# Patient Record
Sex: FEMALE | Race: WHITE | NOT HISPANIC OR LATINO | Employment: OTHER | URBAN - METROPOLITAN AREA
[De-identification: names, ages, dates, MRNs, and addresses within clinical notes are randomized per-mention and may not be internally consistent; named-entity substitution may affect disease eponyms.]

---

## 2021-07-13 ENCOUNTER — LAB REQUISITION (OUTPATIENT)
Dept: LAB | Facility: CLINIC | Age: 81
End: 2021-07-13
Payer: COMMERCIAL

## 2021-07-13 DIAGNOSIS — I48.91 UNSPECIFIED ATRIAL FIBRILLATION (H): ICD-10-CM

## 2021-07-13 LAB — INR PPP: 3.19 (ref 0.85–1.15)

## 2021-07-13 PROCEDURE — 85610 PROTHROMBIN TIME: CPT | Mod: ORL | Performed by: INTERNAL MEDICINE

## 2024-08-12 ENCOUNTER — TRANSFERRED RECORDS (OUTPATIENT)
Dept: HEALTH INFORMATION MANAGEMENT | Facility: CLINIC | Age: 84
End: 2024-08-12
Payer: COMMERCIAL

## 2024-08-15 ENCOUNTER — TRANSFERRED RECORDS (OUTPATIENT)
Dept: HEALTH INFORMATION MANAGEMENT | Facility: CLINIC | Age: 84
End: 2024-08-15
Payer: COMMERCIAL

## 2024-08-15 ENCOUNTER — MEDICAL CORRESPONDENCE (OUTPATIENT)
Dept: HEALTH INFORMATION MANAGEMENT | Facility: CLINIC | Age: 84
End: 2024-08-15
Payer: COMMERCIAL

## 2024-08-15 ENCOUNTER — TRANSCRIBE ORDERS (OUTPATIENT)
Dept: OTHER | Age: 84
End: 2024-08-15

## 2024-08-15 DIAGNOSIS — C55 ENDOMETRIOID ADENOCARCINOMA OF UTERUS (H): Primary | ICD-10-CM

## 2024-08-16 ENCOUNTER — PATIENT OUTREACH (OUTPATIENT)
Dept: ONCOLOGY | Facility: CLINIC | Age: 84
End: 2024-08-16
Payer: COMMERCIAL

## 2024-08-16 NOTE — PROGRESS NOTES
"New Patient Hematology / Oncology Nurse Navigator Note     Referral Date: 8/16/24    Referring provider:       Referring Clinic/Organization:        Referred to: GynOnc    Requested provider (if applicable): First available - did not specify     Evaluation for :        Clinical History (per Nurse review of records provided):    6/7/24 Pelvic US ([facility])  Impression  1. Thickened heterogeneous endometrium measuring 3.7 cm. Given the patient`s age, primary differential consideration would be underlying mass.  2. Nonvisualization of the ovaries. -- BOOKMARKED  6/8/24 Note from OBGYN at [facility]:  \"Pt verbalized frustration with her care that she received at [facility]. She stated that she was not interested in any follow-up procedures or appointments, as she feels it would not change her course of action.   She states she had [facility] \"several years ago\" and \"all of the painful testing they did came up with nothing.\" She states that \"even if they found cancer, I'm not going to have surgery or do any treatment anyway.\"   She respectfully stated that she would not like to follow-up and declines any further interventions.\"  -- BOOKMARKED  8/15/24 Path:   -- BOOKMARKED    Clinical Assessment / Barriers to Care (Per Nurse):  Pt uses [employer] for transportation so needs later morning/afternoon appointment. Lives in [place]    Records Location: Care Everywhere   Faxed - Media tab/Scanned     Records Needed:   Images from [facility] ([place])    Additional testing needed prior to consult:   N/A    Referral updates and Plan:   OUTGOING CALL to pt:  Introduced my role as nurse navigator with [facility] Hematology/Oncology dept and that we have recd the referral for dx of Endometrial Cancer from Dr Adams  Pt confirms they are aware of the referral and ready to schedule  Provided contact information if future questions arise.     -Transferred pt to NPS line [phone] to schedule appt per scheduling instructions.    Pema Pate, " BSN, RN, PHN, OCN  Hematology/Oncology Nurse Navigator  [facility]  [phone]

## 2024-08-26 NOTE — TELEPHONE ENCOUNTER
RECORDS STATUS - ALL OTHER DIAGNOSIS      RECORDS RECEIVED FROM: Neptali /KRISTINA    Appt Date: 8/27/2024    Endometrioid adenocarcinoma of uterus (H)   Action    Action Taken 8/26/2024 12:06pm BENJAMIN     I called [facility] IMG Dept [phone]- they will push the ultrasound image to  PACS    I called [facility] - unavailable.     I called pt Hortensia - outside records are at [facility].     I called [facility] Ph: [phone] #3 - I was on hold... Their phone went to . I called again #4 Fax: [phone] It's lunch time for the nurses until 1pm. I sent over a STAT fax request for records and reports.     I resolved Ne[facility]ali's Scan in PACS.     8/27/2024 9:05AM BENJAMIN   I faxed records from [facility] to HIM and the NN team.     I called [facility] again to find out more info about the biopsy - unavailable      NOTES STATUS DETAILS   OFFICE NOTE from referring provider    Nichole Adams MD      OFFICE NOTE from medical oncologist Received- OGG[facility]    DISCHARGE SUMMARY from hospital CE 6/7/2024- CE   Post-menopausal bleeding    CLINICAL TRIAL TREATMENTS TO DATE     LABS     PATHOLOGY REPORTS Received-  [facility]     ANYTHING RELATED TO DIAGNOSIS In CE Labs last updated on 7/29/2024    PATHOLOGY FEDEX TRACKING   TRACKING #:   GENONOMIC TESTING     TYPE:     IMAGING (NEED IMAGES & REPORT)     CT SCANS     MRI     XRAYS     ULTRASOUND In PACS- [facility]  US Pelvis Complete 6/7/2024    PET     IMAGE DISC FEDEX TRACKING   TRACKING #:

## 2024-08-27 ENCOUNTER — PRE VISIT (OUTPATIENT)
Dept: ONCOLOGY | Facility: CLINIC | Age: 84
End: 2024-08-27
Payer: COMMERCIAL

## 2024-08-27 ENCOUNTER — ONCOLOGY VISIT (OUTPATIENT)
Dept: ONCOLOGY | Facility: CLINIC | Age: 84
End: 2024-08-27
Attending: STUDENT IN AN ORGANIZED HEALTH CARE EDUCATION/TRAINING PROGRAM
Payer: COMMERCIAL

## 2024-08-27 ENCOUNTER — PATIENT OUTREACH (OUTPATIENT)
Dept: ONCOLOGY | Facility: CLINIC | Age: 84
End: 2024-08-27
Payer: COMMERCIAL

## 2024-08-27 VITALS — DIASTOLIC BLOOD PRESSURE: 70 MMHG | SYSTOLIC BLOOD PRESSURE: 128 MMHG | HEART RATE: 83 BPM

## 2024-08-27 DIAGNOSIS — C55 ENDOMETRIOID ADENOCARCINOMA OF UTERUS (H): Primary | ICD-10-CM

## 2024-08-27 DIAGNOSIS — C54.1 ENDOMETRIAL CANCER (H): Primary | ICD-10-CM

## 2024-08-27 PROCEDURE — G0463 HOSPITAL OUTPT CLINIC VISIT: HCPCS | Performed by: OBSTETRICS & GYNECOLOGY

## 2024-08-27 PROCEDURE — 99205 OFFICE O/P NEW HI 60 MIN: CPT | Performed by: OBSTETRICS & GYNECOLOGY

## 2024-08-27 RX ORDER — LORAZEPAM 0.5 MG/1
0.5 TABLET ORAL EVERY 6 HOURS PRN
Status: ON HOLD | COMMUNITY
End: 2024-09-18

## 2024-08-27 RX ORDER — ATENOLOL 50 MG/1
50 TABLET ORAL 2 TIMES DAILY
COMMUNITY
Start: 2023-04-28

## 2024-08-27 RX ORDER — WARFARIN SODIUM 2 MG/1
4 TABLET ORAL DAILY
Status: ON HOLD | COMMUNITY
End: 2024-09-18

## 2024-08-27 RX ORDER — HYDRALAZINE HYDROCHLORIDE 50 MG/1
50 TABLET, FILM COATED ORAL 2 TIMES DAILY
Status: ON HOLD | COMMUNITY
Start: 2023-04-28 | End: 2024-09-24

## 2024-08-27 RX ORDER — PREGABALIN 100 MG/1
100 CAPSULE ORAL 2 TIMES DAILY
COMMUNITY
Start: 2022-12-21

## 2024-08-27 RX ORDER — LEVOTHYROXINE SODIUM 75 UG/1
75 TABLET ORAL DAILY
COMMUNITY
Start: 2023-04-28

## 2024-08-27 RX ORDER — CLONIDINE HYDROCHLORIDE 0.1 MG/1
0.1 TABLET ORAL
Status: ON HOLD | COMMUNITY
Start: 2022-10-04 | End: 2024-09-18

## 2024-08-27 RX ORDER — OXYCODONE AND ACETAMINOPHEN 5; 325 MG/1; MG/1
1 TABLET ORAL EVERY 8 HOURS PRN
Status: ON HOLD | COMMUNITY
Start: 2024-08-19 | End: 2024-09-23

## 2024-08-27 RX ORDER — BACLOFEN 20 MG
1 TABLET ORAL DAILY PRN
COMMUNITY

## 2024-08-27 ASSESSMENT — PAIN SCALES - GENERAL: PAINLEVEL: EXTREME PAIN (8)

## 2024-08-27 NOTE — PROGRESS NOTES
GYNECOLOGIC ONCOLOGY  [address] [address]  Phone: [phone]  Fax: [phone]     Patient:  Hortensia Xie  YOB: 1940  Date of Visit: 08/28/2024    Referring Provider  Jameel Miller MD  [address] [place] [id number]    Reason for visit: endometrial cancer    Assessment    Hortensia Xie is a 83 year old post-menopausal patient PMH notable for partial colectomy, stroke, sinus node dysfunction, cardiac pacemaker in place, atrial fibrillation on anti-coagulation, Type 2 diabetes declines medications, chronic pain on opioid therapy, and hypertension with newly diagnosed endometrial cancer.     Plan   - Endometrial cancer with vaginal bleeding: discussed that standard treatment for endometrial cancer is with hysterectomy. Additional therapy may sometimes be recommended including radiation therapy or chemotherapy pending results from surgery. In her case this has to be balanced against the risks of complications related to surgery with her medical co-morbidities. However, given her need for chronic anticoagulation and symptomatic vaginal bleeding, I think consideration of surgery is necessary although there is risk given her medical conditions.   - She is able to engage in discussion of the risks and verbalizes a desire to proceed with surgery once optimized. Discussed biggest concerns are related to her anticoagulation and cardiac conditions. She has an appointment with her cardiologist in September, so I have advised her to discuss medication management jese-operatively as well as any further work-up they would recommend prior to surgery.   - Even if cleared for surgery we had a anibal discussion of the significant risks of morbidity and even mortality. She notes a willingness and desire to accept these risks for the possibility of cancer treatment and improvement in quality of life.   - Pending surgical  clearance, discussed minimizing surgical risk by performing only TLH-BSO without lymph node assessment. May consider injectin ICG and doing biopsy pending tolerance of surgery, but we will determine that closer to time of surgery.     Anticoagulation plan: needs to discuss with her cardiologist whether she would need bridging from warfarin.     Pacemaker in place: per outside records: (she reports repalcement in 2019 but per records battery exchange 2019)  Indication for Pacemaker: Sinus Node Dysfunction - Tachy/French   Primary EP / Cardiologist: Jamil Peters MD   DEVICE DATA  : Medtronic Model: [place] [id number] Implant Date: 2019  LEAD DATA   Atrial Lead:  Medtronic:  Model 4076-45 Implant Date 2008  RV Lead:  Medtronic:  Model 5076-52 Implant Date 2008     - Chronic pain secondary to stroke: on oxycodone 3-4 tablets a day. Discussed jese-op pain management.     -History of CVA: thought secondary to embolism per cardiology notes. Has residual right sided weakness and paresthesias.    Surgical history: history of partial colectomy for stricture likely secondary to diverticulitis. No records available. Has vertical midline. Also history of  section. Discussed increased risk of bowel injury, complications with surgery.     Next steps: needs to see cardiology, keep appointment 2024. Pending that appointment may need clearance by PAC or Internal medicine unless all medications are managed by cardiology. Once plan in place can schedule for surgery. Will need admission post-op due to co-morbidities. Needs social work to help with ride. Lives alone, may need placement but strongly desires discharge to home. Has hired helper intermittently, support from [employer], but no one in the home or family/friends nearby who can stay with her.     Jameel Miller MD   Gynecologic Oncology     ~~~~~~~~~~~~~~~~~~~~~~~~~~~~~~~~~~~~~~~~~~~~~~~~~~~~~~~~~~~~~~~~~~~~~~~    History of  Present Illness   Per chart review: PMB. Thickened lining to 3.7cm (reviewed images and diffusely thickened). Multiple comorbidities including cardiac as noted elsewhere.     Per patient report: Around age 70 had episodes of bleeding and worked up and was negative. Current bleeding has been going on for a 4 months. Comes and goes. Heavy bleeding when it occurs. Is on warfarin. Goal 2-2.5. Has some pain. Lives independently. Does own ADLs. Uses wheeled walker. Uses shower chair. On lyrica for post-stroke symptoms of neuropathy.     OB/Gynecologic History:  Cesearean section  PMB in 60s with negative work-up    Past Medical History:   Diagnosis Date    Anticoagulated on warfarin     Cardiac pacemaker in situ     Chronic atrial fibrillation (H)     Ablation in     Chronic, continuous use of opioids     Chronic pain from stroke    Endometrial cancer (H)     HTN (hypertension)     Stroke, embolic (H)     Residual right sided weakness    Type 2 diabetes mellitus (H)     Declines medication     Past Surgical History:   Procedure Laterality Date     SECTION      COLECTOMY PARTIAL      Primary anastomosis, due to diverticulitis?    IMPLANT PACEMAKER       No family history on file.  Social History     Tobacco Use    Smoking status: Never    Smokeless tobacco: Never       Physical Exam   /70 (BP Location: Left arm)   Pulse 83   Gen: no acute distress, uses wheeled walker for ambulation outside home.   Abd: vertical midline incision noted, non-tender, slightly protuberant which is normal for her    Data   Laboratory data and imaging listed below was reviewed prior to this encounter  Labs/Pathology:   Reviewed recent HgbA1c, CBC, INR, BMP from -2024    Imaging:   Impression    1. Thickened heterogeneous endometrium measuring 3.7 cm. Given the patient`s age, primary differential consideration would be underlying mass.  2. Nonvisualization of the ovaries.    Dictated by Giovani Tai MD @ 2024 10:21:15  PM    I spent a total of 60 minutes on the care of Hortensia Xie on the day of service including face to face time, care coordination, and documentation on the day of service.

## 2024-08-27 NOTE — NURSING NOTE
Oncology Rooming Note    August 27, 2024 11:21 AM   Hortensia Xie is a 83 year old female who presents for:    Chief Complaint   Patient presents with    Oncology Clinic Visit     Initial Vitals: /70 (BP Location: Left arm)   Pulse 83  There is no height or weight on file to calculate BMI. There is no height or weight on file to calculate BSA.  Extreme Pain (8) Comment: Data Unavailable   No LMP recorded.  Allergies reviewed: Yes  Medications reviewed: Yes    Medications: Medication refills not needed today.  Pharmacy name entered into [employer]: [id number] - [place], [place] - [address] [facility] [address]    Frailty Screening:   Is the patient here for a new oncology consult visit in cancer care? 1. Yes. Over the past month, have you experienced difficulty or required a caregiver to assist with:   1. Balance, walking or general mobility (including any falls)? YES  2. Completion of self-care tasks such as bathing, dressing, toileting, grooming/hygiene?  NO  3. Concentration or memory that affects your daily life?  NO       Clinical concerns: Patient will discuss concerns with provider.       Noel Rodriguez MA

## 2024-08-27 NOTE — PROGRESS NOTES
[facility]: Cancer Care Initial Note                                    Discussion with Patient:                                                      RN met with patient in clinic this afternoon, following her consult appointment with Dr. Miller.  Introduced self and role as RN Care Coordinator with Dr. Miller at the [facility].  Provided contact information for our clinic, including phone numbers for our scheduling team, triage nurses, after-hours advice line, and direct phone number for this RN.    Provided patient with a surgery packet/handouts, and a bottle of Hibiclens soap.  Due to time constraints (patient had to catch a [employer] bus by 12:30 pm), patient was unable to sign surgery consents while in clinic today.  She will plan to sign consents the day of surgery.      Patient also did not have time to stay for surgery education today.  Writer will plan to review the education with patient via telephone sometime prior to her surgery.       Plan:                                                       Patient was informed that she will be receiving a telephone call from our scheduling team soon, to schedule her surgery and post-op appointment with Dr. Miller.      Also informed patient that writer will plan to reach out to her via telephone sometime prior to her surgery, to review pre-op education.      Social Work referral was entered at Dr. Miller's request, due to potential transportation issues and post-op care issues (elderly patient with no family nearby - all friends are elderly - who relies on [employer] for transportation).  Per Dr. Miller, patient will likely end up staying overnight following her surgery, due to her advanced age and co-morbidities.      Patient is scheduled to follow up with her Cardiologist through [facility] on 9/13/24 (Dr. Jamil Peters).  Dr. Miller asked to have Dr. Peters's office called, to inform them of patient's surgery plan -  since patient will need to have a plan made for her anticoagulation.  Writer will plan to reach out to Dr. Peters's office to provide this update.      Per Dr. Miller, if patient's Cardiologist isn't able/willing to clear patient for surgery, and to provide a plan for her anticoagulation - then patient would need to schedule a pre-op H&P exam with either her PCP, or through the PAC Clinic.    Encouraged patient to reach out with any questions or concerns following today's visit.    Luisito Lerner, RN, BSN, OCN  RN Care Coordinator - Oncology  [facility]

## 2024-08-27 NOTE — LETTER
8/27/2024      Hortensia Xie  [address] [place] [address]      Dear Colleague,    Thank you for referring your patient, Hortensia Xie, to the [facility]. Please see a copy of my visit note below.      GYNECOLOGIC ONCOLOGY  [facility]  [address] [address] [place] [address]  Phone: [phone]  Fax: [phone]     Patient:  Hortensia Xie  YOB: 1940  Date of Visit: 08/28/2024    Referring Provider  Jameel Miller MD  [address] [place] [address]    Reason for visit: endometrial cancer    Assessment   Hortensia Xie is a 83 year old post-menopausal patient PMH notable for partial colectomy, stroke, sinus node dysfunction, cardiac pacemaker in place, atrial fibrillation on anti-coagulation, Type 2 diabetes declines medications, chronic pain on opioid therapy, and hypertension with newly diagnosed endometrial cancer.     Plan  - Endometrial cancer with vaginal bleeding: discussed that standard treatment for endometrial cancer is with hysterectomy. Additional therapy may sometimes be recommended including radiation therapy or chemotherapy pending results from surgery. In her case this has to be balanced against the risks of complications related to surgery with her medical co-morbidities. However, given her need for chronic anticoagulation and symptomatic vaginal bleeding, I think consideration of surgery is necessary although there is risk given her medical conditions.   - She is able to engage in discussion of the risks and verbalizes a desire to proceed with surgery once optimized. Discussed biggest concerns are related to her anticoagulation and cardiac conditions. She has an appointment with her cardiologist in September, so I have advised her to discuss medication management jese-operatively as well as any further work-up they would recommend prior to surgery.   - Even if cleared for  surgery we had a anibal discussion of the significant risks of morbidity and even mortality. She notes a willingness and desire to accept these risks for the possibility of cancer treatment and improvement in quality of life.   - Pending surgical clearance, discussed minimizing surgical risk by performing only TLH-BSO without lymph node assessment. May consider injectin ICG and doing biopsy pending tolerance of surgery, but we will determine that closer to time of surgery.     Anticoagulation plan: needs to discuss with her cardiologist whether she would need bridging from warfarin.     Pacemaker in place: per outside records: (she reports repalcement in 2019 but per records battery exchange 2019)  Indication for Pacemaker: Sinus Node Dysfunction - Tachy/French   Primary EP / Cardiologist: Jamil Peters MD   DEVICE DATA  : Medtronic Model: [place] [id number] Implant Date: 2019  LEAD DATA   Atrial Lead:  Medtronic:  Model 4076-45 Implant Date 2008  RV Lead:  Medtronic:  Model 5076-52 Implant Date 2008     - Chronic pain secondary to stroke: on oxycodone 3-4 tablets a day. Discussed jese-op pain management.     -History of CVA: thought secondary to embolism per cardiology notes. Has residual right sided weakness and paresthesias.    Surgical history: history of partial colectomy for stricture likely secondary to diverticulitis. No records available. Has vertical midline. Also history of  section. Discussed increased risk of bowel injury, complications with surgery.     Next steps: needs to see cardiology, keep appointment 2024. Pending that appointment may need clearance by PAC or Internal medicine unless all medications are managed by cardiology. Once plan in place can schedule for surgery. Will need admission post-op due to co-morbidities. Needs social work to help with ride. Lives alone, may need placement but strongly desires discharge to home. Has hired  helper intermittently, support from [employer], but no one in the home or family/friends nearby who can stay with her.     Jameel Miller MD   Gynecologic Oncology     ~~~~~~~~~~~~~~~~~~~~~~~~~~~~~~~~~~~~~~~~~~~~~~~~~~~~~~~~~~~~~~~~~~~~~~~    History of Present Illness  Per chart review: PMB. Thickened lining to 3.7cm (reviewed images and diffusely thickened). Multiple comorbidities including cardiac as noted elsewhere.     Per patient report: Around age 70 had episodes of bleeding and worked up and was negative. Current bleeding has been going on for a 4 months. Comes and goes. Heavy bleeding when it occurs. Is on warfarin. Goal 2-2.5. Has some pain. Lives independently. Does own ADLs. Uses wheeled walker. Uses shower chair. On lyrica for post-stroke symptoms of neuropathy.     OB/Gynecologic History:  Cesearean section  PMB in 60s with negative work-up    Past Medical History:   Diagnosis Date     Anticoagulated on warfarin      Cardiac pacemaker in situ      Chronic atrial fibrillation (H)     Ablation in      Chronic, continuous use of opioids     Chronic pain from stroke     Endometrial cancer (H)      HTN (hypertension)      Stroke, embolic (H)     Residual right sided weakness     Type 2 diabetes mellitus (H)     Declines medication     Past Surgical History:   Procedure Laterality Date      SECTION       COLECTOMY PARTIAL      Primary anastomosis, due to diverticulitis?     IMPLANT PACEMAKER       No family history on file.  Social History     Tobacco Use     Smoking status: Never     Smokeless tobacco: Never       Physical Exam  /70 (BP Location: Left arm)   Pulse 83   Gen: no acute distress, uses wheeled walker for ambulation outside home.   Abd: vertical midline incision noted, non-tender, slightly protuberant which is normal for her    Data  Laboratory data and imaging listed below was reviewed prior to this encounter  Labs/Pathology:   Reviewed recent HgbA1c, CBC, INR, BMP from  1-6/2024    Imaging:   Impression    1. Thickened heterogeneous endometrium measuring 3.7 cm. Given the patient`s age, primary differential consideration would be underlying mass.  2. Nonvisualization of the ovaries.    Dictated by Giovani Tai MD @ 6/7/2024 10:21:15 PM    I spent a total of 60 minutes on the care of Hortensia Xie on the day of service including face to face time, care coordination, and documentation on the day of service.         Again, thank you for allowing me to participate in the care of your patient.        Sincerely,        Jameel Miller MD

## 2024-08-28 ENCOUNTER — PATIENT OUTREACH (OUTPATIENT)
Dept: CARE COORDINATION | Facility: CLINIC | Age: 84
End: 2024-08-28
Payer: COMMERCIAL

## 2024-08-28 NOTE — PROGRESS NOTES
Social Work - [email or web address] message  [facility]  Data: Dx endometrial cancer follows with Dr. Miller.  Patient Name: Hortensia Xie  Goes By: Hortensia BAGLEY/Age: 1940 (83 year old)      Referral Source: Luisito Benedict RN    Reason for Referral: Resources post surgery.     Intervention:  Spoke with Hortensia and she asked if writer could call her on Friday .   Plan:  will contact on Friday.  YENI Mckinney, [facility]   Adult Oncology - [place]  [phone]  [employer] [place] on    *Please note does not work on .   Support Groups at [facility]: Social Work Services for Cancer Patients ([email or web address])

## 2024-08-29 ENCOUNTER — PATIENT OUTREACH (OUTPATIENT)
Dept: ONCOLOGY | Facility: CLINIC | Age: 84
End: 2024-08-29
Payer: COMMERCIAL

## 2024-08-29 NOTE — PROGRESS NOTES
[facility]: Cancer Care Note                                                          Writer placed telephone call to the [facility] this afternoon (phone number: [phone]), to inform patient's Cardiologist (Dr. Jamil Peters) of patient's surgery plans.  Patient is planning to undergo TLH/BSO with Dr. Miller in the near future (not yet scheduled), and will require clearance from Cardiology for the surgery - as well as a plan for her anticoagulation.      Ended up having to leave a detailed voicemail message, with above information.  Provided direct phone number for this RN in case of any questions.    Luisito Lerner, RN, BSN, OCN  RN Care Coordinator - Oncology  [facility]

## 2024-09-03 ENCOUNTER — TELEPHONE (OUTPATIENT)
Dept: ONCOLOGY | Facility: CLINIC | Age: 84
End: 2024-09-03
Payer: COMMERCIAL

## 2024-09-03 NOTE — TELEPHONE ENCOUNTER
Spoke with patient, They are going to follow with cardiologist and PCP to be cleared for anesthesia. Patient stated they would call writer back after confirming and then scheduled with Dr. Miller. Yumi Peacock on 9/3/2024 at 9:49 AM

## 2024-09-05 ENCOUNTER — HOSPITAL ENCOUNTER (INPATIENT)
Facility: CLINIC | Age: 84
Setting detail: SURGERY ADMIT
End: 2024-09-05
Attending: OBSTETRICS & GYNECOLOGY | Admitting: OBSTETRICS & GYNECOLOGY
Payer: COMMERCIAL

## 2024-09-05 NOTE — TELEPHONE ENCOUNTER
Spoke with patient    Patient is schedule for surgery with: Dr. Miller    Surgery Date: 9/19     Location: [facility]    H&P: to be completed by: completed by Cardiologist on 9/13 (per patient they already confirmed that cardiologist will do H&P)     Post-op: will be scheduled by the clinic    Patient was informed that a surgery center pre-op RN will call 2-3 days prior to surgery with arrival time and instructions: Yes     Patient aware times are subject to change up until day before surgery.     Patient questions/concerns: N/A     Surgery packet to be sent via US mail      Yumi Peacock on 9/5/2024 at 3:39 PM

## 2024-09-09 ENCOUNTER — PATIENT OUTREACH (OUTPATIENT)
Dept: CARE COORDINATION | Facility: CLINIC | Age: 84
End: 2024-09-09
Payer: COMMERCIAL

## 2024-09-09 NOTE — PROGRESS NOTES
Social Work - Intervention  [facility]  Data/Intervention:  Dx endometrial cancer follows with Dr. Miller   Patient Name: Hortensia Xie Goes By: Hortensia BAGLEY/Age: 1940 (84 year old)     Visit Type: telephone  Referral Source: Luisito Lerner RNCC  Reason for Referral: Support with plans for after surgery.     Psychosocial Information/Concerns:  Limited support system and need for support following surgery. Daughter - out of state.      Intervention/Education/Resources Provided:  [employer] they offer  care - private pay cost and they provide transportation with .     Unsure if she can afford. Will talk to people at [employer] to see if they can assist.      Assessment/Plan:  She did not need anything else today.     Provided patient/family with contact information and availability.  YENI Mckinney, [facility]   Adult Oncology - [place]  [phone]  [employer] [place] on    *Please note does not work on .   Support Groups at [facility]: Social Work Services for Cancer Patients ([email or web address])

## 2024-09-13 ENCOUNTER — PATIENT OUTREACH (OUTPATIENT)
Dept: ONCOLOGY | Facility: CLINIC | Age: 84
End: 2024-09-13
Payer: COMMERCIAL

## 2024-09-13 NOTE — PROGRESS NOTES
[facility]: Cancer Care Note    Relevant Diagnosis:  Endometrial cancer    Procedure:  Total laparoscopic hysterectomy, bilateral salpingo-oophorectomy.    Procedure Date: 9/19/24    Notes: Attempted reaching patient via telephone this afternoon, to review surgery education.  Patient did not answer, and no voicemail picked up - unable to leave message.  Noted patient scheduled to meet with her Cardiologist today for pre-op exam.    Writer will plan to try calling patient again early next week.      Luisito Lerner, RN, BSN, OCN  RN Care Coordinator - Oncology  [facility]

## 2024-09-17 ENCOUNTER — TELEPHONE (OUTPATIENT)
Dept: ONCOLOGY | Facility: CLINIC | Age: 84
End: 2024-09-17
Payer: COMMERCIAL

## 2024-09-17 ENCOUNTER — PATIENT OUTREACH (OUTPATIENT)
Dept: CARE COORDINATION | Facility: CLINIC | Age: 84
End: 2024-09-17
Payer: COMMERCIAL

## 2024-09-17 NOTE — TELEPHONE ENCOUNTER
----- Message from Luisito MUÑOZ sent at 9/17/2024  2:31 PM CDT -----  Regarding: Pain, bleeding  Hello,    Would someone be able to call this patient to assess her symptoms?  Our  spoke with her today about transportation options before/after surgery (she's scheduled to have surgery with Dr. Miller on 9/19).    Patient mentioned to the SW that she was having very heavy bleeding over the weekend (she mentioned going through multiple pads per hour - unsure if these were fully saturated or not, or how long she was bleeding this heavily).  Patient also mentioned that she is having a lot of pain.    Thank you,  Luisito

## 2024-09-17 NOTE — PROGRESS NOTES
"Social Work - Follow-Up  [facility]    Data/Intervention:  Patient having surgery on the [place] on 24.     Patient Name: Hortensia Xie Goes By: Hortensia BAGLEY/Age: 1940 (84 year old)    Reason for Follow-Up:  Check in/transportation     Collaborated With:    Terri, RNCC    Intervention/Education/Resources Provided:  Patient has transportation arranged both ways to/from surgery. Has a friend to spend the night after surgery with her if discharged after procedure.    Wonders about staying overnight. Worried about bleeding she has had. She understands she has been cleared from cardiology for surgery but scared and wonders if needs monitoring. She is grateful to have the surgery to \"get the cancer out of me\".    Assessment/Plan:  Updated RNCC of plans she has made and of her concerns of bleeding.    Previously provided patient/family with writer's contact information and availability.    YENI Mckinney, [facility]   Adult Oncology - [place]  [phone]  Onsite [place] on    *Please note does not work on .   Support Groups at [facility]: Social Work Services for Cancer Patients ([email or web address])     "

## 2024-09-17 NOTE — TELEPHONE ENCOUNTER
Per Aren, RNCC:    Ok, here is what Dr. Miller recommends for Hortensia Xie:      Schedule lab appointment before surgery - Dr. Miller said she was going to enter lab orders.  Continue holding Coumadin.  Continue taking all other prescription medications until surgery (aside from holding any supplements).  If bleeding symptoms become much worse, or if patient develops worsening symptoms (SOB, chest pain, severe dizziness) - she should go to the [facility] ER on [address], and they will likely just admit her in the hospital ahead of surgery.  The plan is for patient to be admitted overnight after surgery.      Pt was notified with recommendations, and pt verbalized understanding.  Lab appt has been scheduled for 9/18/2024 at 12:30 pm. Pt has called and arranged a ride through [employer].  Pt was also given phone number for after hours triage line in case she has any questions or concerns when clinic is closed.  Reviewed emergent symptoms that would warrant return call to clinic or evaluation in ER.  Patient verbalized understanding and agreement with plan.  Patient was instructed to call the clinic with any questions, concerns, or worsening symptoms.  Candy Haile RN on 9/17/2024 at 4:51 PM

## 2024-09-17 NOTE — TELEPHONE ENCOUNTER
"Oncology Nurse Triage    Situation:   Hortensia reporting the following symptoms: vaginal bleeding     Background:   Treating Provider:   Dr Miller     Date of last office visit: 8/27/2024    Recent Treatments:scheduled for TLH/BSO on 9/19/2024    Assessment:     Writer called pt to follow up after receiving message from RNCC regarding vaginal bleeding.  Pt states that she started having vaginal bleeding after she arrived home from her appt with cardiology on 9/13/2024. States that bleeding was heavy and persisted from 9/13 through yesterday 9/16/2024. Pt states that she was saturating her pad every 4 hours. She also had blood clots. She had severe lower abdominal cramping that felt like menstrual cramps that she rated at 8-9/10 on pain scale.     Pt reports that her bleeding has stopped today. She still has lower abdominal cramping that she rates at 7/10. Pt reports that she does feel weaker today, but denies chest pain or shortness of breath. Pt states that she \"just wants to get the cancer out\". Denies any other signs of bleeding.     Pt states that she never received instructions about stopping her coumadin before surgery. Her cardiologist told her it would be OK to hold coumadin for 3-5 days as her surgeon instructs for upcoming hysterectomy. Pt states that she decreased her dose of coumadin to 2 mg on 9/14/2024. She did not take any coumadin on 9/15 or 9/16.    Recommendations:     Will route to Dr Miller for review and recommendations regarding symptoms and coumadin. Also paged Dr Miller.   Patient verbalized understanding and agreement with plan.  Patient was instructed to call the clinic with any questions, concerns, or worsening symptoms.  Candy Haiel RN on 9/17/2024 at 3:22 PM          "

## 2024-09-18 ENCOUNTER — TELEPHONE (OUTPATIENT)
Dept: ONCOLOGY | Facility: CLINIC | Age: 84
End: 2024-09-18
Payer: COMMERCIAL

## 2024-09-18 ENCOUNTER — HOSPITAL ENCOUNTER (INPATIENT)
Facility: CLINIC | Age: 84
LOS: 6 days | Discharge: NURSING, REHAB OR LONG-TERM CARE | DRG: 740 | End: 2024-09-24
Attending: EMERGENCY MEDICINE | Admitting: OBSTETRICS & GYNECOLOGY
Payer: COMMERCIAL

## 2024-09-18 ENCOUNTER — ANESTHESIA EVENT (OUTPATIENT)
Dept: SURGERY | Facility: CLINIC | Age: 84
DRG: 740 | End: 2024-09-18
Payer: COMMERCIAL

## 2024-09-18 DIAGNOSIS — I10 HYPERTENSION, UNSPECIFIED TYPE: ICD-10-CM

## 2024-09-18 DIAGNOSIS — Z95.0 PACEMAKER: ICD-10-CM

## 2024-09-18 DIAGNOSIS — Z79.891 LONG-TERM CURRENT USE OF OPIATE ANALGESIC: ICD-10-CM

## 2024-09-18 DIAGNOSIS — C54.1 ENDOMETRIAL CANCER (H): Primary | ICD-10-CM

## 2024-09-18 DIAGNOSIS — Z90.49 ACQUIRED ABSENCE OF LARGE INTESTINE: ICD-10-CM

## 2024-09-18 DIAGNOSIS — Z86.79 PERSONAL HISTORY OF OTHER DISEASES OF THE CIRCULATORY SYSTEM: ICD-10-CM

## 2024-09-18 DIAGNOSIS — Z86.73 HISTORY OF STROKE: ICD-10-CM

## 2024-09-18 DIAGNOSIS — N93.9 VAGINAL BLEEDING: ICD-10-CM

## 2024-09-18 DIAGNOSIS — Z86.39 PERSONAL HISTORY OF ENDOCRINE DISORDER: ICD-10-CM

## 2024-09-18 LAB
ABO/RH(D): NORMAL
ALBUMIN SERPL BCG-MCNC: 3.6 G/DL (ref 3.5–5.2)
ALP SERPL-CCNC: 106 U/L (ref 40–150)
ALT SERPL W P-5'-P-CCNC: 10 U/L (ref 0–50)
ANION GAP SERPL CALCULATED.3IONS-SCNC: 11 MMOL/L (ref 7–15)
ANTIBODY SCREEN: NEGATIVE
APTT PPP: 24 SECONDS (ref 22–38)
AST SERPL W P-5'-P-CCNC: 38 U/L (ref 0–45)
BASOPHILS # BLD AUTO: 0 10E3/UL (ref 0–0.2)
BASOPHILS NFR BLD AUTO: 1 %
BILIRUB SERPL-MCNC: 0.3 MG/DL
BUN SERPL-MCNC: 11.7 MG/DL (ref 8–23)
CALCIUM SERPL-MCNC: 9.2 MG/DL (ref 8.8–10.4)
CHLORIDE SERPL-SCNC: 100 MMOL/L (ref 98–107)
CREAT SERPL-MCNC: 0.76 MG/DL (ref 0.51–0.95)
EGFRCR SERPLBLD CKD-EPI 2021: 78 ML/MIN/1.73M2
EOSINOPHIL # BLD AUTO: 0.1 10E3/UL (ref 0–0.7)
EOSINOPHIL NFR BLD AUTO: 1 %
ERYTHROCYTE [DISTWIDTH] IN BLOOD BY AUTOMATED COUNT: 14.6 % (ref 10–15)
GLUCOSE BLDC GLUCOMTR-MCNC: 109 MG/DL (ref 70–99)
GLUCOSE BLDC GLUCOMTR-MCNC: 90 MG/DL (ref 70–99)
GLUCOSE SERPL-MCNC: 126 MG/DL (ref 70–99)
HCO3 SERPL-SCNC: 24 MMOL/L (ref 22–29)
HCT VFR BLD AUTO: 33.8 % (ref 35–47)
HGB BLD-MCNC: 10.9 G/DL (ref 11.7–15.7)
IMM GRANULOCYTES # BLD: 0 10E3/UL
IMM GRANULOCYTES NFR BLD: 0 %
INR PPP: 1.41 (ref 0.85–1.15)
LYMPHOCYTES # BLD AUTO: 1.1 10E3/UL (ref 0.8–5.3)
LYMPHOCYTES NFR BLD AUTO: 13 %
MCH RBC QN AUTO: 28 PG (ref 26.5–33)
MCHC RBC AUTO-ENTMCNC: 32.2 G/DL (ref 31.5–36.5)
MCV RBC AUTO: 87 FL (ref 78–100)
MONOCYTES # BLD AUTO: 0.8 10E3/UL (ref 0–1.3)
MONOCYTES NFR BLD AUTO: 9 %
NEUTROPHILS # BLD AUTO: 6.5 10E3/UL (ref 1.6–8.3)
NEUTROPHILS NFR BLD AUTO: 76 %
NRBC # BLD AUTO: 0 10E3/UL
NRBC BLD AUTO-RTO: 0 /100
PLATELET # BLD AUTO: 213 10E3/UL (ref 150–450)
POTASSIUM SERPL-SCNC: 4.4 MMOL/L (ref 3.4–5.3)
PROT SERPL-MCNC: 7.4 G/DL (ref 6.4–8.3)
RBC # BLD AUTO: 3.89 10E6/UL (ref 3.8–5.2)
SODIUM SERPL-SCNC: 135 MMOL/L (ref 135–145)
SPECIMEN EXPIRATION DATE: NORMAL
WBC # BLD AUTO: 8.5 10E3/UL (ref 4–11)

## 2024-09-18 PROCEDURE — 86901 BLOOD TYPING SEROLOGIC RH(D): CPT | Performed by: EMERGENCY MEDICINE

## 2024-09-18 PROCEDURE — 99285 EMERGENCY DEPT VISIT HI MDM: CPT | Performed by: EMERGENCY MEDICINE

## 2024-09-18 PROCEDURE — 86923 COMPATIBILITY TEST ELECTRIC: CPT

## 2024-09-18 PROCEDURE — 250N000013 HC RX MED GY IP 250 OP 250 PS 637: Performed by: STUDENT IN AN ORGANIZED HEALTH CARE EDUCATION/TRAINING PROGRAM

## 2024-09-18 PROCEDURE — 250N000011 HC RX IP 250 OP 636: Performed by: STUDENT IN AN ORGANIZED HEALTH CARE EDUCATION/TRAINING PROGRAM

## 2024-09-18 PROCEDURE — 250N000013 HC RX MED GY IP 250 OP 250 PS 637

## 2024-09-18 PROCEDURE — 250N000011 HC RX IP 250 OP 636: Performed by: EMERGENCY MEDICINE

## 2024-09-18 PROCEDURE — 82962 GLUCOSE BLOOD TEST: CPT

## 2024-09-18 PROCEDURE — 120N000002 HC R&B MED SURG/OB UMMC

## 2024-09-18 PROCEDURE — 96374 THER/PROPH/DIAG INJ IV PUSH: CPT | Performed by: EMERGENCY MEDICINE

## 2024-09-18 PROCEDURE — 85004 AUTOMATED DIFF WBC COUNT: CPT | Performed by: EMERGENCY MEDICINE

## 2024-09-18 PROCEDURE — 36415 COLL VENOUS BLD VENIPUNCTURE: CPT | Performed by: EMERGENCY MEDICINE

## 2024-09-18 PROCEDURE — 250N000011 HC RX IP 250 OP 636

## 2024-09-18 PROCEDURE — 86900 BLOOD TYPING SEROLOGIC ABO: CPT | Performed by: EMERGENCY MEDICINE

## 2024-09-18 PROCEDURE — 84155 ASSAY OF PROTEIN SERUM: CPT | Performed by: EMERGENCY MEDICINE

## 2024-09-18 PROCEDURE — 85610 PROTHROMBIN TIME: CPT | Performed by: EMERGENCY MEDICINE

## 2024-09-18 PROCEDURE — 85730 THROMBOPLASTIN TIME PARTIAL: CPT | Performed by: EMERGENCY MEDICINE

## 2024-09-18 PROCEDURE — 99285 EMERGENCY DEPT VISIT HI MDM: CPT | Mod: 25 | Performed by: EMERGENCY MEDICINE

## 2024-09-18 RX ORDER — NALOXONE HYDROCHLORIDE 0.4 MG/ML
0.2 INJECTION, SOLUTION INTRAMUSCULAR; INTRAVENOUS; SUBCUTANEOUS
Status: CANCELLED | OUTPATIENT
Start: 2024-09-18

## 2024-09-18 RX ORDER — LIDOCAINE 40 MG/G
CREAM TOPICAL
Status: DISCONTINUED | OUTPATIENT
Start: 2024-09-18 | End: 2024-09-23

## 2024-09-18 RX ORDER — ACETAMINOPHEN 650 MG/1
650 SUPPOSITORY RECTAL EVERY 6 HOURS
Status: DISCONTINUED | OUTPATIENT
Start: 2024-09-18 | End: 2024-09-24 | Stop reason: HOSPADM

## 2024-09-18 RX ORDER — SODIUM CHLORIDE, SODIUM LACTATE, POTASSIUM CHLORIDE, CALCIUM CHLORIDE 600; 310; 30; 20 MG/100ML; MG/100ML; MG/100ML; MG/100ML
INJECTION, SOLUTION INTRAVENOUS CONTINUOUS
Status: DISCONTINUED | OUTPATIENT
Start: 2024-09-19 | End: 2024-09-19

## 2024-09-18 RX ORDER — LORAZEPAM 0.5 MG/1
0.5 TABLET ORAL EVERY 6 HOURS PRN
Status: DISCONTINUED | OUTPATIENT
Start: 2024-09-18 | End: 2024-09-18

## 2024-09-18 RX ORDER — AMOXICILLIN 250 MG
2 CAPSULE ORAL 2 TIMES DAILY PRN
Status: DISCONTINUED | OUTPATIENT
Start: 2024-09-18 | End: 2024-09-21

## 2024-09-18 RX ORDER — LEVOTHYROXINE SODIUM 75 UG/1
75 TABLET ORAL DAILY
Status: DISCONTINUED | OUTPATIENT
Start: 2024-09-19 | End: 2024-09-24 | Stop reason: HOSPADM

## 2024-09-18 RX ORDER — OXYCODONE HYDROCHLORIDE 5 MG/1
5 TABLET ORAL EVERY 4 HOURS PRN
Status: DISCONTINUED | OUTPATIENT
Start: 2024-09-18 | End: 2024-09-18

## 2024-09-18 RX ORDER — NALOXONE HYDROCHLORIDE 0.4 MG/ML
0.4 INJECTION, SOLUTION INTRAMUSCULAR; INTRAVENOUS; SUBCUTANEOUS
Status: DISCONTINUED | OUTPATIENT
Start: 2024-09-18 | End: 2024-09-24 | Stop reason: HOSPADM

## 2024-09-18 RX ORDER — NALOXONE HYDROCHLORIDE 0.4 MG/ML
0.2 INJECTION, SOLUTION INTRAMUSCULAR; INTRAVENOUS; SUBCUTANEOUS
Status: DISCONTINUED | OUTPATIENT
Start: 2024-09-18 | End: 2024-09-24 | Stop reason: HOSPADM

## 2024-09-18 RX ORDER — PROCHLORPERAZINE 25 MG
12.5 SUPPOSITORY, RECTAL RECTAL EVERY 12 HOURS PRN
Status: DISCONTINUED | OUTPATIENT
Start: 2024-09-18 | End: 2024-09-24 | Stop reason: HOSPADM

## 2024-09-18 RX ORDER — FENTANYL CITRATE 50 UG/ML
25-50 INJECTION, SOLUTION INTRAMUSCULAR; INTRAVENOUS
Status: CANCELLED | OUTPATIENT
Start: 2024-09-18

## 2024-09-18 RX ORDER — ATENOLOL 50 MG/1
50 TABLET ORAL DAILY
Status: DISCONTINUED | OUTPATIENT
Start: 2024-09-18 | End: 2024-09-18

## 2024-09-18 RX ORDER — HYDROMORPHONE HYDROCHLORIDE 1 MG/ML
0.5 INJECTION, SOLUTION INTRAMUSCULAR; INTRAVENOUS; SUBCUTANEOUS ONCE
Status: COMPLETED | OUTPATIENT
Start: 2024-09-18 | End: 2024-09-18

## 2024-09-18 RX ORDER — PREGABALIN 100 MG/1
100 CAPSULE ORAL DAILY
Status: DISCONTINUED | OUTPATIENT
Start: 2024-09-18 | End: 2024-09-18

## 2024-09-18 RX ORDER — FLUMAZENIL 0.1 MG/ML
0.2 INJECTION, SOLUTION INTRAVENOUS
Status: CANCELLED | OUTPATIENT
Start: 2024-09-18

## 2024-09-18 RX ORDER — WARFARIN SODIUM 1 MG/1
4 TABLET ORAL DAILY
Status: DISCONTINUED | OUTPATIENT
Start: 2024-09-18 | End: 2024-09-18

## 2024-09-18 RX ORDER — OXYCODONE HYDROCHLORIDE 10 MG/1
10 TABLET ORAL EVERY 4 HOURS PRN
Status: DISCONTINUED | OUTPATIENT
Start: 2024-09-18 | End: 2024-09-19

## 2024-09-18 RX ORDER — WARFARIN SODIUM 1 MG/1
2 TABLET ORAL DAILY
COMMUNITY

## 2024-09-18 RX ORDER — CALCIUM CARBONATE 500 MG/1
1000 TABLET, CHEWABLE ORAL 4 TIMES DAILY PRN
Status: DISCONTINUED | OUTPATIENT
Start: 2024-09-18 | End: 2024-09-24 | Stop reason: HOSPADM

## 2024-09-18 RX ORDER — DEXTROSE MONOHYDRATE 25 G/50ML
25-50 INJECTION, SOLUTION INTRAVENOUS
Status: DISCONTINUED | OUTPATIENT
Start: 2024-09-18 | End: 2024-09-24 | Stop reason: HOSPADM

## 2024-09-18 RX ORDER — CLONIDINE HYDROCHLORIDE 0.1 MG/1
0.1 TABLET ORAL ONCE
Status: DISCONTINUED | OUTPATIENT
Start: 2024-09-18 | End: 2024-09-18

## 2024-09-18 RX ORDER — CLONIDINE HYDROCHLORIDE 0.1 MG/1
0.1 TABLET ORAL
Status: CANCELLED | OUTPATIENT
Start: 2024-09-18

## 2024-09-18 RX ORDER — LORAZEPAM 0.5 MG/1
0.5 TABLET ORAL EVERY 6 HOURS PRN
Status: CANCELLED | OUTPATIENT
Start: 2024-09-18

## 2024-09-18 RX ORDER — NICOTINE POLACRILEX 4 MG
15-30 LOZENGE BUCCAL
Status: DISCONTINUED | OUTPATIENT
Start: 2024-09-18 | End: 2024-09-24 | Stop reason: HOSPADM

## 2024-09-18 RX ORDER — MORPHINE SULFATE 4 MG/ML
4 INJECTION, SOLUTION INTRAMUSCULAR; INTRAVENOUS ONCE
Status: COMPLETED | OUTPATIENT
Start: 2024-09-18 | End: 2024-09-18

## 2024-09-18 RX ORDER — HYDRALAZINE HYDROCHLORIDE 50 MG/1
50 TABLET, FILM COATED ORAL
Status: CANCELLED | OUTPATIENT
Start: 2024-09-18

## 2024-09-18 RX ORDER — FEXOFENADINE HCL 60 MG/1
60 TABLET, FILM COATED ORAL DAILY PRN
COMMUNITY

## 2024-09-18 RX ORDER — PREGABALIN 100 MG/1
100 CAPSULE ORAL 2 TIMES DAILY
Status: DISCONTINUED | OUTPATIENT
Start: 2024-09-18 | End: 2024-09-24 | Stop reason: HOSPADM

## 2024-09-18 RX ORDER — VITAMIN B COMPLEX
1 TABLET ORAL DAILY
COMMUNITY

## 2024-09-18 RX ORDER — FAMOTIDINE 20 MG/1
20 TABLET, FILM COATED ORAL 2 TIMES DAILY
Status: DISCONTINUED | OUTPATIENT
Start: 2024-09-18 | End: 2024-09-22

## 2024-09-18 RX ORDER — MAGNESIUM OXIDE 400 MG/1
400 TABLET ORAL DAILY PRN
Status: DISCONTINUED | OUTPATIENT
Start: 2024-09-18 | End: 2024-09-24 | Stop reason: HOSPADM

## 2024-09-18 RX ORDER — NALOXONE HYDROCHLORIDE 0.4 MG/ML
0.4 INJECTION, SOLUTION INTRAMUSCULAR; INTRAVENOUS; SUBCUTANEOUS
Status: CANCELLED | OUTPATIENT
Start: 2024-09-18

## 2024-09-18 RX ORDER — OXYCODONE AND ACETAMINOPHEN 5; 325 MG/1; MG/1
1 TABLET ORAL EVERY 6 HOURS PRN
Status: DISCONTINUED | OUTPATIENT
Start: 2024-09-18 | End: 2024-09-18

## 2024-09-18 RX ORDER — DISOPYRAMIDE PHOSPHATE 100 MG/1
100 CAPSULE ORAL EVERY 8 HOURS
Status: DISCONTINUED | OUTPATIENT
Start: 2024-09-18 | End: 2024-09-24 | Stop reason: HOSPADM

## 2024-09-18 RX ORDER — OXYCODONE HYDROCHLORIDE 5 MG/1
5 TABLET ORAL EVERY 4 HOURS PRN
Status: DISCONTINUED | OUTPATIENT
Start: 2024-09-18 | End: 2024-09-19

## 2024-09-18 RX ORDER — DISOPYRAMIDE PHOSPHATE 100 MG/1
100 CAPSULE ORAL EVERY 8 HOURS
COMMUNITY

## 2024-09-18 RX ORDER — ATENOLOL 50 MG/1
50 TABLET ORAL 2 TIMES DAILY
Status: DISCONTINUED | OUTPATIENT
Start: 2024-09-18 | End: 2024-09-24 | Stop reason: HOSPADM

## 2024-09-18 RX ORDER — AMOXICILLIN 250 MG
1 CAPSULE ORAL 2 TIMES DAILY PRN
Status: DISCONTINUED | OUTPATIENT
Start: 2024-09-18 | End: 2024-09-21

## 2024-09-18 RX ORDER — ASCORBIC ACID 500 MG
1000 TABLET ORAL DAILY
Status: DISCONTINUED | OUTPATIENT
Start: 2024-09-18 | End: 2024-09-18

## 2024-09-18 RX ORDER — PROCHLORPERAZINE MALEATE 5 MG
5 TABLET ORAL EVERY 6 HOURS PRN
Status: DISCONTINUED | OUTPATIENT
Start: 2024-09-18 | End: 2024-09-24 | Stop reason: HOSPADM

## 2024-09-18 RX ORDER — ACETAMINOPHEN 325 MG/1
975 TABLET ORAL EVERY 6 HOURS
Status: DISCONTINUED | OUTPATIENT
Start: 2024-09-18 | End: 2024-09-24 | Stop reason: HOSPADM

## 2024-09-18 RX ORDER — HYDRALAZINE HYDROCHLORIDE 50 MG/1
50 TABLET, FILM COATED ORAL 2 TIMES DAILY
Status: DISCONTINUED | OUTPATIENT
Start: 2024-09-18 | End: 2024-09-19

## 2024-09-18 RX ADMIN — OXYCODONE HYDROCHLORIDE 10 MG: 10 TABLET ORAL at 20:29

## 2024-09-18 RX ADMIN — HYDROMORPHONE HYDROCHLORIDE 0.5 MG: 1 INJECTION, SOLUTION INTRAMUSCULAR; INTRAVENOUS; SUBCUTANEOUS at 12:56

## 2024-09-18 RX ADMIN — ACETAMINOPHEN 975 MG: 325 TABLET ORAL at 14:52

## 2024-09-18 RX ADMIN — HYDROMORPHONE HYDROCHLORIDE 0.5 MG: 1 INJECTION, SOLUTION INTRAMUSCULAR; INTRAVENOUS; SUBCUTANEOUS at 18:21

## 2024-09-18 RX ADMIN — OXYCODONE HYDROCHLORIDE 5 MG: 5 TABLET ORAL at 14:52

## 2024-09-18 RX ADMIN — FAMOTIDINE 20 MG: 10 INJECTION, SOLUTION INTRAVENOUS at 14:52

## 2024-09-18 RX ADMIN — HYDRALAZINE HYDROCHLORIDE 50 MG: 50 TABLET ORAL at 18:23

## 2024-09-18 RX ADMIN — PREGABALIN 100 MG: 50 CAPSULE ORAL at 20:28

## 2024-09-18 RX ADMIN — ACETAMINOPHEN 975 MG: 325 TABLET ORAL at 20:28

## 2024-09-18 RX ADMIN — ATENOLOL 50 MG: 50 TABLET ORAL at 20:28

## 2024-09-18 RX ADMIN — FAMOTIDINE 20 MG: 20 TABLET ORAL at 20:29

## 2024-09-18 RX ADMIN — PROCHLORPERAZINE EDISYLATE 5 MG: 5 INJECTION INTRAMUSCULAR; INTRAVENOUS at 14:52

## 2024-09-18 RX ADMIN — DISOPYRAMIDE PHOSPHATE 100 MG: 100 CAPSULE, GELATIN COATED ORAL at 17:30

## 2024-09-18 RX ADMIN — MORPHINE SULFATE 4 MG: 4 INJECTION INTRAVENOUS at 11:15

## 2024-09-18 ASSESSMENT — COLUMBIA-SUICIDE SEVERITY RATING SCALE - C-SSRS
2. HAVE YOU ACTUALLY HAD ANY THOUGHTS OF KILLING YOURSELF IN THE PAST MONTH?: NO
6. HAVE YOU EVER DONE ANYTHING, STARTED TO DO ANYTHING, OR PREPARED TO DO ANYTHING TO END YOUR LIFE?: NO
1. IN THE PAST MONTH, HAVE YOU WISHED YOU WERE DEAD OR WISHED YOU COULD GO TO SLEEP AND NOT WAKE UP?: NO

## 2024-09-18 ASSESSMENT — ACTIVITIES OF DAILY LIVING (ADL)
DOING_ERRANDS_INDEPENDENTLY_DIFFICULTY: NO
ADLS_ACUITY_SCORE: 35
TOILETING_ISSUES: NO
FALL_HISTORY_WITHIN_LAST_SIX_MONTHS: NO
WALKING_OR_CLIMBING_STAIRS_DIFFICULTY: NO
CONCENTRATING,_REMEMBERING_OR_MAKING_DECISIONS_DIFFICULTY: NO
ADLS_ACUITY_SCORE: 35
DIFFICULTY_COMMUNICATING: NO
ADLS_ACUITY_SCORE: 35
ADLS_ACUITY_SCORE: 18
WEAR_GLASSES_OR_BLIND: NO
DIFFICULTY_EATING/SWALLOWING: NO
DRESSING/BATHING_DIFFICULTY: NO
HEARING_DIFFICULTY_OR_DEAF: NO
ADLS_ACUITY_SCORE: 18
CHANGE_IN_FUNCTIONAL_STATUS_SINCE_ONSET_OF_CURRENT_ILLNESS/INJURY: NO
ADLS_ACUITY_SCORE: 35
ADLS_ACUITY_SCORE: 18
ADLS_ACUITY_SCORE: 35
EQUIPMENT_CURRENTLY_USED_AT_HOME: WALKER, STANDARD
ADLS_ACUITY_SCORE: 35
ADLS_ACUITY_SCORE: 18
ADLS_ACUITY_SCORE: 35
ADLS_ACUITY_SCORE: 35
ADLS_ACUITY_SCORE: 18

## 2024-09-18 NOTE — H&P
[facility] Gyn/Onc  History and Physical    Hortensia Xie MRN# [id number]   Age: 84 year old YOB: 1940     Date of Admission:  9/18/2024    Primary care provider: Blayne Chadwick         Chief Complaint:   Vaginal bleeding and cramping         History of Present Illness:   This patient is a 84 year old with PMH notable for partial colectomy, stroke, sinus node dysfunction, cardiac pacemaker in place, atrial fibrillation on anti-coagulation, Type 2 diabetes declines medications, chronic pain on opioid therapy, and hypertension with newly diagnosed endometrial cancer, who presents today with 4 days of heavy vaginal bleeding and cramping.    She has had intermittent bleeding since age 70, when a workup was negative for malignancy. Her current episode began in April with intermittent heavy bleeding. Endometrial lining was 3.7cm on ultrasound in June 2024, and endometrial biopsy pathology from 8/15/2024 showed FIGO I endometrioid endometrial adenocarcinoma. Her bleeding had slowed down, but 4 days ago, she began experiencing significant bleeding and pelvic pain. She reports this AM, she bled through 3-4 pads in an hour and bled through her underwear. Endorses dizziness and weakness at home, and felt she could barely walk. The pelvic pain was not improved on her home pain medications. She denies chest pain, SOB, headache, diarrhea, constipation, fever/chills, and dysuria. She last took her warfarin 4 days ago in preparation for surgery tomorrow.            Cancer Treatment History:   AUB at age 70 with normal workup    Admission for vaginal bleeding at [facility] 6/7/24 - 6/8/24  TVUS 6/7/2024 with thickened endometrium up to 3.7cm thick  EMB 8/15/2024 with FIGO I endometrioid endometrial adenocarcinoma  Hysterectomy, BSO planned for 9/19/2024         Past Medical History:     Past Medical History:   Diagnosis Date    Anticoagulated on warfarin     Cardiac pacemaker in  situ     Chronic atrial fibrillation (H)     Ablation in 2008    Chronic, continuous use of opioids     Chronic pain from stroke    Endometrial cancer (H)     HTN (hypertension)     Stroke, embolic (H)     Residual right sided weakness    Type 2 diabetes mellitus (H)     Declines medication            Past Surgical History:      Past Surgical History:   Procedure Laterality Date     SECTION      COLECTOMY PARTIAL      Primary anastomosis, due to diverticulitis?    IMPLANT PACEMAKER              Social History:     Social History     Tobacco Use    Smoking status: Never    Smokeless tobacco: Never   Substance Use Topics    Alcohol use: Not on file            Family History:   No family history on file.         Allergies:     Allergies   Allergen Reactions    Penicillins Anaphylaxis    Cephalosporins Hives     Reports 'cross-over'     Codeine Nausea and Nausea and Vomiting     PN: LW Reaction: VOMIT    Diltiazem      >3 second cardiac pauses    Hydrocodone Nausea and Vomiting    Hydrocodone-Acetaminophen Itching and Nausea    Ibuprofen Swelling and Unknown     PN: LW Reaction: EDEMA    Iodinated Contrast Media Difficulty breathing, Hives and Photosensitivity    Nsaids Difficulty breathing and Photosensitivity    Potassium Unknown     PN: LW Reaction: STOMACH PAIN    Amlodipine Other (See Comments), Itching and Rash    Clindamycin Difficulty breathing and Rash    Irbesartan Itching and Rash     heart racing, stomach upset    Lisinopril Rash    Olmesartan Rash            Medications:     Current Facility-Administered Medications   Medication Dose Route Frequency Provider Last Rate Last Admin    acetaminophen (TYLENOL) tablet 975 mg  975 mg Oral Q6H Junior Robertson MD        Or    acetaminophen (TYLENOL) Suppository 650 mg  650 mg Rectal Q6H Junior Robertson MD        atenolol (TENORMIN) tablet 50 mg  50 mg Oral Daily Junior Robertson MD        calcium carbonate (TUMS) chewable tablet 1,000 mg  1,000 mg Oral 4x  Daily PRN Junior Robertson MD        [Held by provider] cloNIDine (CATAPRES) tablet 0.1 mg  0.1 mg Oral Once Junior Robertson MD        glucose gel 15-30 g  15-30 g Oral Q15 Min PRN Junior Robertson MD        Or    dextrose 50 % injection 25-50 mL  25-50 mL Intravenous Q15 Min PRN Junior Robertson MD        Or    glucagon injection 1 mg  1 mg Subcutaneous Q15 Min PRN Junior Robertson MD        disopyramide (NORPACE) capsule 100 mg  100 mg Oral Q8H Junior Robertson MD        famotidine (PEPCID) injection 20 mg  20 mg Intravenous Q12H Junior Robertson MD        famotidine (PEPCID) tablet 20 mg  20 mg Oral BID Junior Robertson MD        hydrALAZINE (APRESOLINE) tablet 50 mg  50 mg Oral BID Junior Robertson MD        insulin aspart (NovoLOG) injection (RAPID ACTING)  1-7 Units Subcutaneous TID AC Junior Robertson MD        insulin aspart (NovoLOG) injection (RAPID ACTING)  1-5 Units Subcutaneous At Bedtime Junior Robertson MD        [START ON 9/19/2024] levothyroxine (SYNTHROID/LEVOTHROID) tablet 75 mcg  75 mcg Oral Daily Junior Robertson MD        lidocaine (LMX4) cream   Topical Q1H PRN Junior Robertson MD        lidocaine 1 % 0.1-1 mL  0.1-1 mL Other Q1H PRN Junior Robertson MD        [Held by provider] LORazepam (ATIVAN) tablet 0.5 mg  0.5 mg Oral Q6H PRN Junior Robertson MD        magnesium oxide (MAG-OX) tablet 400 mg  400 mg Oral Daily PRN Junior Robertson MD        oxyCODONE (ROXICODONE) tablet 5 mg  5 mg Oral Q4H PRN Junior Robertson MD        oxyCODONE IR (ROXICODONE) half-tab 2.5 mg  2.5 mg Oral Q4H PRN Junior Robertson MD        [Held by provider] oxyCODONE-acetaminophen (PERCOCET) 5-325 MG per tablet 1 tablet  1 tablet Oral Q6H PRN Junior Robertson MD        pregabalin (LYRICA) capsule 100 mg  100 mg Oral Daily Junior Robertson MD        prochlorperazine (COMPAZINE) injection 5 mg  5 mg Intravenous Q6H PRN Junior Robertson MD        Or    prochlorperazine (COMPAZINE) tablet 5 mg  5 mg Oral Q6H PRN Marcelo  MD Junior        Or    prochlorperazine (COMPAZINE) suppository 12.5 mg  12.5 mg Rectal Q12H PRN Junior Robertson MD        senna-docusate (SENOKOT-S/PERICOLACE) 8.6-50 MG per tablet 1 tablet  1 tablet Oral BID PRN Junior Robertson MD        Or    senna-docusate (SENOKOT-S/PERICOLACE) 8.6-50 MG per tablet 2 tablet  2 tablet Oral BID PRN Junior Robertson MD        sodium chloride (PF) 0.9% PF flush 3 mL  3 mL Intracatheter Q8H Junior Robertson MD        sodium chloride (PF) 0.9% PF flush 3 mL  3 mL Intracatheter q1 min prn Junior Robertson MD        [Held by provider] vitamin C (ASCORBIC ACID) tablet 1,000 mg  1,000 mg Oral Daily Junior Robertson MD        [Held by provider] warfarin ANTICOAGULANT (COUMADIN) tablet 4 mg  4 mg Oral Daily Junior Robertson MD         Current Outpatient Medications   Medication Sig Dispense Refill    disopyramide (NORPACE) 100 MG capsule Take 100 mg by mouth every 8 hours.      ascorbic acid 1000 MG TABS tablet Take 1,000 mg by mouth.      atenolol (TENORMIN) 50 MG tablet Take 50 mg by mouth.      cloNIDine (CATAPRES) 0.1 MG tablet Take 0.1 mg by mouth.      hydrALAZINE (APRESOLINE) 50 MG tablet 50 mg.      levothyroxine (SYNTHROID/LEVOTHROID) 75 MCG tablet Take 75 mcg by mouth daily.      LORazepam (ATIVAN) 0.5 MG tablet Take 0.5 mg by mouth every 6 hours as needed.      Magnesium Oxide -Mg Supplement 500 MG TABS Take 1 tablet by mouth daily as needed.      oxyCODONE-acetaminophen (PERCOCET) 5-325 MG tablet 1 tablet every 6 hours as needed.      pregabalin (LYRICA) 100 MG capsule Take 100 mg by mouth daily.      warfarin ANTICOAGULANT (COUMADIN) 2 MG tablet Take 4 mg by mouth daily.              Review of Systems:     Negative except as per HPI    CONSTITUTIONAL: Negative for  fevers, chills, fatigue, anorexia and weight loss  SKIN: Negative for rashes  HEENT: Negative for vision changes  RESPIRATORY: Negative for  cough, shortness of breath  CARDIOVASCULAR: Negative for  chest pain,  "dyspnea  GASTROINTESTINAL: Negative for nausea, vomiting. + Abdominal pain  GENITOURINARY: Negative for frequency, dysuria. See HPI.  NEUROLOGIC: + Dizziness, weakness           Physical Exam:     Vitals:    09/18/24 1038 09/18/24 1100 09/18/24 1200 09/18/24 1300   BP: (!) 168/140 139/72 (!) 145/74 138/74   Pulse: 88 83 71 78   Resp: 22      Temp: 99.6  F (37.6  C)      TempSrc: Axillary      SpO2: 93% 97% 96% (!) 89%   Height: 1.676 m (5' 6\")        Constitutional: Appears frail, uncomfortable  HEENT: Right sided facial droop  Cardiovascular: Regular rate and rhythm, no apparent murmurs  Respiratory: Clear to auscultation bilaterally without crackles or wheezes  Gastrointestinal: Abdomen soft, non-distended. Mildly tender to lower quadrants, moderately tender to suprapubic region.  Pelvic Exam: No gross bleeding from introitus. Approximately 30cc of blood on chux beneath patient.   Neuro: Right sided deficits consistent with LMA stroke in 2009.  Extremities: 1-2+ pitting edema in BLE to knees  Skin: No suspicious lesions or rashes, no sores on back           Data:     Results for orders placed or performed during the hospital encounter of 09/18/24 (from the past 24 hour(s))   CBC with platelets differential    Narrative    The following orders were created for panel order CBC with platelets differential.  Procedure                               Abnormality         Status                     ---------                               -----------         ------                     CBC with platelets and d...[[id number]]  Abnormal            Final result                 Please view results for these tests on the individual orders.   INR   Result Value Ref Range    INR 1.41 (H) 0.85 - 1.15   Partial thromboplastin time   Result Value Ref Range    aPTT 24 22 - 38 Seconds   ABO/Rh type and screen    Narrative    The following orders were created for panel order ABO/Rh type and screen.  Procedure                               " Abnormality         Status                     ---------                               -----------         ------                     Adult Type and Screen[[id number]]                            Final result                 Please view results for these tests on the individual orders.   Comprehensive metabolic panel   Result Value Ref Range    Sodium 135 135 - 145 mmol/L    Potassium 4.4 3.4 - 5.3 mmol/L    Carbon Dioxide (CO2) 24 22 - 29 mmol/L    Anion Gap 11 7 - 15 mmol/L    Urea Nitrogen 11.7 8.0 - 23.0 mg/dL    Creatinine 0.76 0.51 - 0.95 mg/dL    GFR Estimate 78 >60 mL/min/1.73m2    Calcium 9.2 8.8 - 10.4 mg/dL    Chloride 100 98 - 107 mmol/L    Glucose 126 (H) 70 - 99 mg/dL    Alkaline Phosphatase 106 40 - 150 U/L    AST 38 0 - 45 U/L    ALT 10 0 - 50 U/L    Protein Total 7.4 6.4 - 8.3 g/dL    Albumin 3.6 3.5 - 5.2 g/dL    Bilirubin Total 0.3 <=1.2 mg/dL   CBC with platelets and differential   Result Value Ref Range    WBC Count 8.5 4.0 - 11.0 10e3/uL    RBC Count 3.89 3.80 - 5.20 10e6/uL    Hemoglobin 10.9 (L) 11.7 - 15.7 g/dL    Hematocrit 33.8 (L) 35.0 - 47.0 %    MCV 87 78 - 100 fL    MCH 28.0 26.5 - 33.0 pg    MCHC 32.2 31.5 - 36.5 g/dL    RDW 14.6 10.0 - 15.0 %    Platelet Count 213 150 - 450 10e3/uL    % Neutrophils 76 %    % Lymphocytes 13 %    % Monocytes 9 %    % Eosinophils 1 %    % Basophils 1 %    % Immature Granulocytes 0 %    NRBCs per 100 WBC 0 <1 /100    Absolute Neutrophils 6.5 1.6 - 8.3 10e3/uL    Absolute Lymphocytes 1.1 0.8 - 5.3 10e3/uL    Absolute Monocytes 0.8 0.0 - 1.3 10e3/uL    Absolute Eosinophils 0.1 0.0 - 0.7 10e3/uL    Absolute Basophils 0.0 0.0 - 0.2 10e3/uL    Absolute Immature Granulocytes 0.0 <=0.4 10e3/uL    Absolute NRBCs 0.0 10e3/uL   Adult Type and Screen   Result Value Ref Range    ABO/RH(D) B POS     Antibody Screen Negative Negative    SPECIMEN EXPIRATION DATE [id number]                Assessment and Plan:   Assessment: 84 year old with PMH of partial colectomy,  stroke, sinus node dysfunction, cardiac pacemaker in place, atrial fibrillation on anti-coagulation, Type 2 diabetes declines medications, chronic pain on opioid therapy, and hypertension with newly diagnosed endometrial cancer, here with 4 days of vaginal bleeding with planned TLH, BSO tomorrow. Plan to admit for bleeding and will continue with surgery as planned.     # Vaginal bleeding  # Endometrial cancer  - Bleeding decreased on exam today  - Hgb not concerning or emergent at this time, and symptoms of anemia has resolved since admission.   - INR 1.41  - Continue with plan for TLH, BSO tomorrow as scheduled  - Warfarin discontinued 4 days ago. Continue to hold Warfarin prior to surgery, will unhold after.   - Repeat CBC, INR in AM prior to surgery    # Type 2 Diabetes, not on medication  - Patient has previously declined medication  - Sliding scale insulin, POCT glucose 4x daily while in house  - Hypoglycemia precautions    # HTN  - PTA atenolol, hydralazine    # Atrial fibrillation  # Pacemaker in situ  - Warfarin held until surgery, will restart after  - PTA norpace    # History of stroke  # Chronic pain 2/2 stroke  - PTA Lyrica  - Mary Tylenol, PRN oxycodone for pain - patient takes Percocet at home.  - Fall precautions in place    #Hypothyroidism  - PTA levothyroxine    Patient seen and discussed with Dr. Kim.    Junior Robertson MD, MSc  Gynecologic Oncology, PGY-1  09/18/2024 1:54 PM    To reach the Gynecologic Oncology resident, please page [phone]

## 2024-09-18 NOTE — PROGRESS NOTES
[facility]: Cancer Care Note                                                          Chart review completed in preparation for patient's surgery with Dr. Miller tomorrow.  Noted patient ended up going to the [facility] ER today due to bleeding and pain, and appears she is getting admitted to the inpatient service.    Will follow patient's chart for status updates.  Closing out encounter at this time.    Luisito Lerner, RN, BSN, OCN  RN Care Coordinator - Oncology  [facility]

## 2024-09-18 NOTE — ED NOTES
Bed: ED14  Expected date:   Expected time:   Means of arrival:   Comments:  [id number] vaginal bleed ETA 15

## 2024-09-18 NOTE — TELEPHONE ENCOUNTER
Family is calling on behalf of the patient today to notify us patient is having severe vaginal bleeding today. She has gone through 4 pads an hour this last hour. Patient is feeling dizzy and lightheaded. She does not want to move much d/t fear of fainting.     Recommended patient be seen immediately in the ED. [facility] ER is recommended. They will call 911 for assistance and transport to ED.     Writer will update care team.     Minnie Torers RN, BSN, PHN, OCN  [facility]

## 2024-09-18 NOTE — ED PROVIDER NOTES
[facility] ([facility])    24       ED PROVIDER NOTE  ED 14    History     Chief Complaint   Patient presents with    Vaginal Bleeding     HPI  Hortensia Xie is a 84 year old female with a past medical history significant for recently diagnosed endometrial cancer, partial colectomy, stroke, sinus node dysfunction, cardiac pacemaker in place, atrial fibrillation, DM2, chronic pain on opioid therapy, and hypertension who presents to the Emergency Department for evaluation of vaginal bleeding. Patient states since Sebastien 9/15 she has been bleeding a lot vaginally. She reports going through 3-4 pads a day. She feels as if she is still bleeding. No chest pain,  dizziness, shortness of breath, fevers or vomiting. She also reports intense vaginal pain. She reports she has a hysterectomy upcoming on Thursday and was taken off her Warfarin due to this.     Past Medical History  Past Medical History:   Diagnosis Date    Anticoagulated on warfarin     Cardiac pacemaker in situ     Chronic atrial fibrillation (H)     Ablation in     Chronic, continuous use of opioids     Chronic pain from stroke    Endometrial cancer (H)     HTN (hypertension)     Stroke, embolic (H)     Residual right sided weakness    Type 2 diabetes mellitus (H)     Declines medication     Past Surgical History:   Procedure Laterality Date     SECTION      COLECTOMY PARTIAL      Primary anastomosis, due to diverticulitis?    IMPLANT PACEMAKER       ascorbic acid 1000 MG TABS tablet  atenolol (TENORMIN) 50 MG tablet  cloNIDine (CATAPRES) 0.1 MG tablet  hydrALAZINE (APRESOLINE) 50 MG tablet  levothyroxine (SYNTHROID/LEVOTHROID) 75 MCG tablet  LORazepam (ATIVAN) 0.5 MG tablet  Magnesium Oxide -Mg Supplement 500 MG TABS  oxyCODONE-acetaminophen (PERCOCET) 5-325 MG tablet  pregabalin (LYRICA) 100 MG capsule  warfarin ANTICOAGULANT (COUMADIN) 2 MG tablet      Allergies   Allergen Reactions    Penicillins  "Anaphylaxis    Cephalosporins Hives     Reports 'cross-over'     Codeine Nausea and Nausea and Vomiting     PN: LW Reaction: VOMIT    Diltiazem      >3 second cardiac pauses    Hydrocodone Nausea and Vomiting    Hydrocodone-Acetaminophen Itching and Nausea    Ibuprofen Swelling and Unknown     PN: LW Reaction: EDEMA    Iodinated Contrast Media Difficulty breathing, Hives and Photosensitivity    Nsaids Difficulty breathing and Photosensitivity    Potassium Unknown     PN: LW Reaction: STOMACH PAIN    Amlodipine Other (See Comments), Itching and Rash    Clindamycin Difficulty breathing and Rash    Irbesartan Itching and Rash     heart racing, stomach upset    Lisinopril Rash    Olmesartan Rash     Family History  No family history on file.  Social History   Social History     Tobacco Use    Smoking status: Never    Smokeless tobacco: Never      Past medical history, past surgical history, medications, allergies, family history, and social history were reviewed with the patient. No additional pertinent items.   A medically appropriate review of systems was performed with pertinent positives and negatives noted in the HPI, and all other systems negative.    Physical Exam   BP: (!) 168/140  Pulse: 88  Temp: 99.6  F (37.6  C)  Resp: 22  Height: 167.6 cm (5' 6\")  SpO2: 93 %  Physical Exam  Physical Exam   Constitutional: oriented to person, place, and time. appears well-developed and well-nourished.   HENT:   Head: Normocephalic and atraumatic.   Neck: Normal range of motion.   Pulmonary/Chest: Effort normal. No respiratory distress.   Cardiac: No murmurs, rubs, gallops. RRR.  Abdominal: Abdomen soft, nontender, nondistended. No rebound tenderness.  MSK: Long bones without deformity or evidence of trauma  Neurological: alert and oriented to person, place, and time.   Skin: Skin is warm and dry.   Psychiatric:  normal mood and affect.  behavior is normal. Thought content normal.   : Small mount of vaginal bleeding, " significant pain with examination, no discharge.    ED Course, Procedures, & Data      Procedures            Results for orders placed or performed during the hospital encounter of 09/18/24   INR     Status: Abnormal   Result Value Ref Range    INR 1.41 (H) 0.85 - 1.15   Partial thromboplastin time     Status: Normal   Result Value Ref Range    aPTT 24 22 - 38 Seconds   Comprehensive metabolic panel     Status: Abnormal   Result Value Ref Range    Sodium 135 135 - 145 mmol/L    Potassium 4.4 3.4 - 5.3 mmol/L    Carbon Dioxide (CO2) 24 22 - 29 mmol/L    Anion Gap 11 7 - 15 mmol/L    Urea Nitrogen 11.7 8.0 - 23.0 mg/dL    Creatinine 0.76 0.51 - 0.95 mg/dL    GFR Estimate 78 >60 mL/min/1.73m2    Calcium 9.2 8.8 - 10.4 mg/dL    Chloride 100 98 - 107 mmol/L    Glucose 126 (H) 70 - 99 mg/dL    Alkaline Phosphatase 106 40 - 150 U/L    AST 38 0 - 45 U/L    ALT 10 0 - 50 U/L    Protein Total 7.4 6.4 - 8.3 g/dL    Albumin 3.6 3.5 - 5.2 g/dL    Bilirubin Total 0.3 <=1.2 mg/dL   CBC with platelets and differential     Status: Abnormal   Result Value Ref Range    WBC Count 8.5 4.0 - 11.0 10e3/uL    RBC Count 3.89 3.80 - 5.20 10e6/uL    Hemoglobin 10.9 (L) 11.7 - 15.7 g/dL    Hematocrit 33.8 (L) 35.0 - 47.0 %    MCV 87 78 - 100 fL    MCH 28.0 26.5 - 33.0 pg    MCHC 32.2 31.5 - 36.5 g/dL    RDW 14.6 10.0 - 15.0 %    Platelet Count 213 150 - 450 10e3/uL    % Neutrophils 76 %    % Lymphocytes 13 %    % Monocytes 9 %    % Eosinophils 1 %    % Basophils 1 %    % Immature Granulocytes 0 %    NRBCs per 100 WBC 0 <1 /100    Absolute Neutrophils 6.5 1.6 - 8.3 10e3/uL    Absolute Lymphocytes 1.1 0.8 - 5.3 10e3/uL    Absolute Monocytes 0.8 0.0 - 1.3 10e3/uL    Absolute Eosinophils 0.1 0.0 - 0.7 10e3/uL    Absolute Basophils 0.0 0.0 - 0.2 10e3/uL    Absolute Immature Granulocytes 0.0 <=0.4 10e3/uL    Absolute NRBCs 0.0 10e3/uL   Adult Type and Screen     Status: None   Result Value Ref Range    ABO/RH(D) B POS     Antibody Screen Negative  Negative    SPECIMEN EXPIRATION DATE [id number]    CBC with platelets differential     Status: Abnormal    Narrative    The following orders were created for panel order CBC with platelets differential.  Procedure                               Abnormality         Status                     ---------                               -----------         ------                     CBC with platelets and d...[[id number]]  Abnormal            Final result                 Please view results for these tests on the individual orders.   ABO/Rh type and screen     Status: None    Narrative    The following orders were created for panel order ABO/Rh type and screen.  Procedure                               Abnormality         Status                     ---------                               -----------         ------                     Adult Type and Screen[[id number]]                            Final result                 Please view results for these tests on the individual orders.     Medications - No data to display  Labs Ordered and Resulted from Time of ED Arrival to Time of ED Departure - No data to display  No orders to display          Critical care was not performed.     Medical Decision Making  The patient's presentation was of high complexity (an acute health issue posing potential threat to life or bodily function).    The patient's evaluation involved:  review of external note(s) from 1 sources (most recent gynecology oncology note from clinic)  strong consideration of a test (CT abdomen pelvis) that was ultimately deferred  ordering and/or review of 3+ test(s) in this encounter (see separate area of note for details)  discussion of management or test interpretation with another health professional (oncology oncology)    The patient's management necessitated high risk (a parenteral controlled substance) and high risk (a decision regarding hospitalization).    Assessment & Plan    MDM  Patient resenting with  vaginal bleeding in the setting of endometrial cancer.  She is stable here.  Hemoglobin about 1 point from most recent I can see in the chart from June.  Patient denying symptoms of anemia.  Pain medications given.  Gynecology oncology saw the patient and they plan to admit.  She is not having significant bleeding while here in the emergency department thus far.    I have reviewed the nursing notes. I have reviewed the findings, diagnosis, plan and need for follow up with the patient.    New Prescriptions    No medications on file       Final diagnoses:   Vaginal bleeding   I, GENNA SUAREZ, am serving as a trained medical scribe to document services personally performed by Benitez Foster MD, based on the provider's statements to me.     IBenitez MD, was physically present and have reviewed and verified the accuracy of this note documented by GENNA SUAREZ.    Benitez Foster MD  [facility] EMERGENCY DEPARTMENT  9/18/2024        Benitez Foster MD  09/18/24 1210

## 2024-09-18 NOTE — LETTER
Recipient:  [facility]        Sender:  ADELSO Wells  Ph: [phone]        Date: September 24, 2024  Patient Name:  Hortensia Xie  Patient YOB: 1940  Routing Message:  Please see attached discharge orders for the above patient and call [phone] with any questions or concerns. Thanks!         The documents accompanying this notice contain confidential information belonging to the sender.  This information is intended only for the use of the individual or entity named above.  The authorized recipient of this information is prohibited from disclosing this information to any other party and is required to destroy the information after its stated need has been fulfilled, unless otherwise required by state law.    If you are not the intended recipient, you are hereby notified that any disclosure, copy, distribution or action taken in reliance on the contents of these documents is strictly prohibited.  If you have received this document in error, please return it by fax to [phone] with a note on the cover sheet explaining why you are returning it (e.g. not your patient, not your provider, etc.).  If you need further assistance, please call .  Documents may also be returned by mail to [facility], , [address], [place], [place] [address].

## 2024-09-18 NOTE — ED TRIAGE NOTES
"Pt arrives via EMS from home, she lives alone. Reports immense bleeding from vaginal area since Sunday. Since then her pain has been increasing and the amount of blood has increased. She reports \"3 pads\" has been soaked.     Pt reports she's having surgery for her cancer in the vaginal area.      Triage Assessment (Adult)       Row Name 09/18/24 1040          Triage Assessment    Airway WDL WDL        Respiratory WDL    Respiratory WDL WDL        Skin Circulation/Temperature WDL    Skin Circulation/Temperature WDL WDL        Cardiac WDL    Cardiac WDL rhythm;X  A paced        Peripheral/Neurovascular WDL    Peripheral Neurovascular WDL WDL        Cognitive/Neuro/Behavioral WDL    Cognitive/Neuro/Behavioral WDL WDL                     "

## 2024-09-18 NOTE — PROGRESS NOTES
"Gynecology Oncology Progress Note  September 19, 2024    Subjective: Pain well controlled. Denies fevers, chills, chest pain, SOB. Notes a scrape on her heel from the carpet at home, currently bandaged. Had a nightmare last night and was a little confused. No other questions or concerns. Getting ready to head down to preop area.    Objective:   BP (!) 148/76 (BP Location: Right arm)   Pulse 69   Temp 98.1  F (36.7  C) (Oral)   Resp 16   Ht 1.676 m (5' 6\")   SpO2 98%     General: NAD  CV: RRR, well perfused  Resp: No increased work of breathing on room air  Abdomen: soft, nontender, nondistended  Extremities: nontender, no edema, left heel with bandage     I/Os  (Yesterday // Since Midnight)  PO: 160 mL // 0 mL  IV: 0 mL // NR  Urine NR // NR    New labs/imaging-   Latest Reference Range & Units 09/19/24 05:29 09/19/24 05:30   WBC 4.0 - 11.0 10e3/uL  7.2   Hemoglobin 11.7 - 15.7 g/dL  11.3 (L)   Hematocrit 35.0 - 47.0 %  35.1   Platelet Count 150 - 450 10e3/uL  190   RBC Count 3.80 - 5.20 10e6/uL  3.90   MCV 78 - 100 fL  90   MCH 26.5 - 33.0 pg  29.0   MCHC 31.5 - 36.5 g/dL  32.2   RDW 10.0 - 15.0 %  14.6   INR 0.85 - 1.15  1.56 (H)    (L): Data is abnormally low  (H): Data is abnormally high      Assessment/Plan: Hortensia Xie is an 84 year old with PMH of partial colectomy, stroke, sinus node dysfunction w/ cardiac pacemaker in place, atrial fibrillation on anti-coagulation, Type 2 diabetes, chronic pain on opioid therapy, and hypertension with newly diagnosed endometrial cancer, here with 4 days of vaginal bleeding with planned TLH, BSO later this morning. She was admitted for bleeding yesterday with plan to continue with surgery as today.     # Vaginal bleeding  # Endometrial cancer  Bleeding decreased on exam yesterday; anticoagulation held. No signs/sx of ABLA.  - Surgery scheduled today. Heading to preop area now.  - Repeat CBC, INR ordered     # Type 2 Diabetes, not on medication  - Patient has " previously declined medication  - Sliding scale insulin, POCT glucose 4x daily while in house  - Hypoglycemia precautions     # HTN  - PTA atenolol, hydralazine      # Atrial fibrillation  # Pacemaker in situ  - Warfarin held until surgery, will restart after  - PTA norpace     # History of stroke  # Chronic pain 2/2 stroke  - PTA Lyrica continued in house  - Mary Tylenol, PRN oxycodone for pain - patient takes Percocet at home.  - Fall, delerium precautions in place     # Hypothyroidism  - PTA levothyroxine    Disposition: Plan for continued admission following surgery. Dispo pending postoperative goals and PT/OT evaluation.    Zaid Velasquez MD MPH  Gynecologic Oncology, PGY-4  Pager [phone]

## 2024-09-18 NOTE — DISCHARGE SUMMARY
Gynecologic Oncology Discharge Summary    Hortensia Xie  [id number]    Admit Date: 9/18/2024  Discharge Date: 9/24/2024  Admitting Provider: Kash Kim MD  Discharge Provider: Kash Kim MD    Admission Dx:   - Endometrial cancer  - Vaginal bleeding  - Sinus node dysfunction  - Afib on chronic anticoagulation  - HTN  - Cardiac pacemaker  - Partial colectomy  - Anxiety  - Acute on chronic pain  - Hypothyroidism  - DMII    Discharge Dx:  - Endometrial cancer  - Sinus node dysfunction  - Afib on chronic anticoagulation  - HTN  - Cardiac pacemaker  - Partial colectomy  - Anxiety  - Acute on chronic pain  - Hypothyroidism  - DMII    Patient Active Problem List   Diagnosis    Vaginal bleeding       Procedures: Total laparoscopic hysterectomy, bilateral salpingo-oophorectomy, lysis of adhesions for greater than 60 minutes, repair of vaginal laceration, excision of small bowel nodule     Prior to Admission Medications:    Current Outpatient Medications   Medication Sig Dispense Refill    ascorbic acid 1000 MG TABS tablet Take 1,000 mg by mouth.      atenolol (TENORMIN) 50 MG tablet Take 50 mg by mouth.      hydrALAZINE (APRESOLINE) 50 MG tablet 50 mg.      levothyroxine (SYNTHROID/LEVOTHROID) 75 MCG tablet Take 75 mcg by mouth daily.      Magnesium Oxide -Mg Supplement 500 MG TABS Take 1 tablet by mouth daily as needed.      oxyCODONE-acetaminophen (PERCOCET) 5-325 MG tablet 1 tablet every 6 hours as needed.      pregabalin (LYRICA) 100 MG capsule Take 100 mg by mouth daily.      warfarin ANTICOAGULANT (COUMADIN) 2 MG tablet Take 4 mg by mouth daily.     Diopyramide phosphate 100 mg Q8H      Discharge Medications:     Review of your medicines        START taking        Dose / Directions   acetaminophen 325 MG tablet  Commonly known as: TYLENOL  Used for: Endometrial cancer (H)      Dose: 650 mg  Take 2 tablets (650 mg) by mouth every 6 hours as needed for mild pain.  Refills: 0     artificial saliva Soln  solution  Used for: Endometrial cancer (H)      Dose: 1-2 spray  Swish and spit 1-2 sprays in mouth every hour as needed for dry mouth.  Refills: 0     bisacodyl 10 MG suppository  Commonly known as: DULCOLAX  Used for: Endometrial cancer (H)      Dose: 10 mg  Place 1 suppository (10 mg) rectally daily as needed for constipation.  Refills: 0     oxyCODONE 5 MG tablet  Commonly known as: ROXICODONE  Used for: Endometrial cancer (H)      Dose: 2.5-5 mg  Take 0.5-1 tablets (2.5-5 mg) by mouth every 6 hours as needed for moderate to severe pain.  Quantity: 6 tablet  Refills: 0     polyethylene glycol 17 GM/Dose powder  Commonly known as: MIRALAX  Used for: Endometrial cancer (H)      Dose: 17 g  Take 17 g by mouth daily.  Refills: 0     senna-docusate 8.6-50 MG tablet  Commonly known as: SENOKOT-S/PERICOLACE  Used for: Endometrial cancer (H)      Dose: 2 tablet  Take 2 tablets by mouth 2 times daily as needed for constipation.  Refills: 0            CONTINUE these medicines which may have CHANGED, or have new prescriptions. If we are uncertain of the size of tablets/capsules you have at home, strength may be listed as something that might have changed.        Dose / Directions   hydrALAZINE 50 MG tablet  Commonly known as: APRESOLINE  This may have changed: when to take this  Used for: Hypertension, unspecified type      Dose: 50 mg  Take 1 tablet (50 mg) by mouth every 8 hours.  Refills: 0            CONTINUE these medicines which have NOT CHANGED        Dose / Directions   ascorbic acid 1000 MG Tabs tablet      Dose: 1,000 mg  Take 1,000 mg by mouth daily.  Refills: 0     atenolol 50 MG tablet  Commonly known as: TENORMIN      Dose: 50 mg  Take 50 mg by mouth 2 times daily.  Refills: 0     disopyramide 100 MG capsule  Commonly known as: NORPACE      Dose: 100 mg  Take 100 mg by mouth every 8 hours.  Refills: 0     fexofenadine 60 MG tablet  Commonly known as: ALLEGRA      Dose: 60 mg  Take 60 mg by mouth daily as  needed for allergies.  Refills: 0     levothyroxine 75 MCG tablet  Commonly known as: SYNTHROID/LEVOTHROID      Dose: 75 mcg  Take 75 mcg by mouth daily.  Refills: 0     Magnesium Oxide -Mg Supplement 500 MG Tabs      Dose: 1 tablet  Take 1 tablet by mouth daily as needed.  Refills: 0     pregabalin 100 MG capsule  Commonly known as: LYRICA      Dose: 100 mg  Take 100 mg by mouth 2 times daily.  Refills: 0     Vitamin D3 25 mcg (1000 units) tablet  Commonly known as: CHOLECALCIFEROL      Dose: 1 tablet  Take 1 tablet by mouth daily.  Refills: 0     warfarin ANTICOAGULANT 1 MG tablet  Commonly known as: COUMADIN      Dose: 2 mg  Take 2 mg by mouth daily. Or as instructed by anticoag clinic.  Refills: 0            STOP taking      oxyCODONE-acetaminophen 5-325 MG tablet  Commonly known as: PERCOCET                  Where to get your medicines        Some of these will need a paper prescription and others can be bought over the counter. Ask your nurse if you have questions.    Bring a paper prescription for each of these medications  oxyCODONE 5 MG tablet           Consultations:   OT/PT, Speech Language/Path, WOC, Care Coordination, Social Work, Pharmacy    Brief History of Illness:  This patient is a 84 year old with PMH notable for partial colectomy, stroke, sinus node dysfunction, cardiac pacemaker in place, atrial fibrillation on anti-coagulation, Type 2 diabetes declines medications, chronic pain on opioid therapy, and hypertension with newly diagnosed endometrial cancer, who presents today with 4 days of heavy vaginal bleeding.    Hospital Course:  Dz:   - Preoperative diagnosis was endometrial cancer. Final pathology is pending at the time of discharge.  She will follow-up postoperatively for a care plan.  FEN:   - Patient given regular diet on admission. NPO prior to procedure. Post op she was maintained on IVF until POD#1, when her diet was slowly advanced. Due to some difficulty with swallowing, she was  evaluated by SLP who recommended a minced/soft diet and thin liquids only. By discharge, she was tolerating a minced/soft diet without nausea and vomiting and able to maintain her hydration without IVF supplementation.  Pain:   - On admission patient was given acetaminophen and oxycodone for pain control. Continued on home pregabalin. Post op her pain was initially controlled with acetaminophen, pregabalin, and oxycodone.  Her pain was well controlled on this and she was discharged home with these medications.  CV:   - She has a history of sinus node dysfunction, afib on chronic anticoagulation, HTN and cardiac pacemaker in place. Warfarin was discontinued 4 days prior to admission and resumed postop. She was continued on home atenolol, disopyramide, and hydralazine. She was hypertensive during admission and hydralazine was increased to Q8H. TCU provider and PCP to further manage.   PULM:   - She has no history of pulmonary issues.  She was initially given O2 supplementation in to maintain her O2 sats in the immediate postop period and was transitioned off of this without difficulty.  By discharge, her O2 sats were greater than 90% on RA.  She was encouraged to use her bedside IS while in house.  She had no acute pulmonary issues while in house.  HEME:   - Patient presented with vaginal bleeding. Her Hgb was 10.9.  Her hgb dropped to 9.7 postoperatively and was stable at 9.5 at the time of discharge. See CV regarding chronic anticoagulation. She had no other acute heme issues while in house.  GI:   - She was made NPO prior to the procedure.  On POD#0, her diet was advanced slowly as tolerated.  At the time of discharge, she was tolerating a minced/soft diet without nausea and vomiting.  She was passing gas and had a bowel movement prior to discharge. She will be discharged with a bowel regimen to prevent constipation in the postoperative period.  She had no acute GI issues while in house.  :    -  A solis catheter  was placed at the time of the surgery and removed at the end of the surgical case.  Prior to discharge, the patient was voiding spontaneously without difficulty although she reported some incontinence, which appears to be her baseline.  She had no acute  issues while in house.  ID:   - The patient was AF during her hospitalization.  She received standard preoperative antibiotics without incident.    ENDO:   - Hx of hypothyroidism, continued on home levothyroxine. Hx of DMII, does not take home meds. Glucose monitored ACHS, no insulin given while admitted.  PSYCH/NEURO:   -  Hx of anxiety, no acute issues.  PPX:    - She was given SCDs and IS during her hospital course.  She tolerated these prophylactic interventions without incident.  She was resumed on home warfarin prior to discharge.      Discharge Instructions and Follow up:  Ms. Hortensia Xie was discharged from the hospital with follow up scheduled as below.    Discharge Diet: Regular  Discharge Activity: Activity as tolerated  Discharge Follow up: 10/15/2024 Dr Miller      Discharge Disposition:  Discharged to TCU    Discharge Staff: MD Leydi Caldwell, APRN, DNP, CNP  9/24/2024 8:42 AM

## 2024-09-19 ENCOUNTER — ANESTHESIA (OUTPATIENT)
Dept: SURGERY | Facility: CLINIC | Age: 84
DRG: 740 | End: 2024-09-19
Payer: COMMERCIAL

## 2024-09-19 ENCOUNTER — ANCILLARY PROCEDURE (OUTPATIENT)
Dept: ULTRASOUND IMAGING | Facility: CLINIC | Age: 84
End: 2024-09-19
Payer: COMMERCIAL

## 2024-09-19 LAB
BLD PROD TYP BPU: NORMAL
BLOOD COMPONENT TYPE: NORMAL
CODING SYSTEM: NORMAL
CROSSMATCH: NORMAL
CROSSMATCH: NORMAL
ERYTHROCYTE [DISTWIDTH] IN BLOOD BY AUTOMATED COUNT: 14.6 % (ref 10–15)
GLUCOSE BLDC GLUCOMTR-MCNC: 117 MG/DL (ref 70–99)
GLUCOSE BLDC GLUCOMTR-MCNC: 118 MG/DL (ref 70–99)
GLUCOSE BLDC GLUCOMTR-MCNC: 152 MG/DL (ref 70–99)
HCT VFR BLD AUTO: 35.1 % (ref 35–47)
HGB BLD-MCNC: 11.3 G/DL (ref 11.7–15.7)
INR PPP: 1.56 (ref 0.85–1.15)
ISSUE DATE AND TIME: NORMAL
ISSUE DATE AND TIME: NORMAL
MCH RBC QN AUTO: 29 PG (ref 26.5–33)
MCHC RBC AUTO-ENTMCNC: 32.2 G/DL (ref 31.5–36.5)
MCV RBC AUTO: 90 FL (ref 78–100)
PLATELET # BLD AUTO: 190 10E3/UL (ref 150–450)
RBC # BLD AUTO: 3.9 10E6/UL (ref 3.8–5.2)
UNIT ABO/RH: NORMAL
UNIT NUMBER: NORMAL
UNIT STATUS: NORMAL
UNIT TYPE ISBT: 1700
UNIT TYPE ISBT: 1700
UNIT TYPE ISBT: 8400
UNIT TYPE ISBT: 8400
WBC # BLD AUTO: 7.2 10E3/UL (ref 4–11)

## 2024-09-19 PROCEDURE — 88341 IMHCHEM/IMCYTCHM EA ADD ANTB: CPT | Mod: 26 | Performed by: STUDENT IN AN ORGANIZED HEALTH CARE EDUCATION/TRAINING PROGRAM

## 2024-09-19 PROCEDURE — 370N000017 HC ANESTHESIA TECHNICAL FEE, PER MIN: Performed by: OBSTETRICS & GYNECOLOGY

## 2024-09-19 PROCEDURE — 250N000011 HC RX IP 250 OP 636

## 2024-09-19 PROCEDURE — 250N000011 HC RX IP 250 OP 636: Performed by: NURSE ANESTHETIST, CERTIFIED REGISTERED

## 2024-09-19 PROCEDURE — 88305 TISSUE EXAM BY PATHOLOGIST: CPT | Mod: 26 | Performed by: STUDENT IN AN ORGANIZED HEALTH CARE EDUCATION/TRAINING PROGRAM

## 2024-09-19 PROCEDURE — 85027 COMPLETE CBC AUTOMATED: CPT

## 2024-09-19 PROCEDURE — 250N000013 HC RX MED GY IP 250 OP 250 PS 637: Performed by: OBSTETRICS & GYNECOLOGY

## 2024-09-19 PROCEDURE — 250N000009 HC RX 250: Performed by: NURSE ANESTHETIST, CERTIFIED REGISTERED

## 2024-09-19 PROCEDURE — 272N000001 HC OR GENERAL SUPPLY STERILE: Performed by: OBSTETRICS & GYNECOLOGY

## 2024-09-19 PROCEDURE — 0UT24ZZ RESECTION OF BILATERAL OVARIES, PERCUTANEOUS ENDOSCOPIC APPROACH: ICD-10-PCS | Performed by: OBSTETRICS & GYNECOLOGY

## 2024-09-19 PROCEDURE — 999N000248 HC STATISTIC IV INSERT WITH US BY RN

## 2024-09-19 PROCEDURE — 0DNU4ZZ RELEASE OMENTUM, PERCUTANEOUS ENDOSCOPIC APPROACH: ICD-10-PCS | Performed by: OBSTETRICS & GYNECOLOGY

## 2024-09-19 PROCEDURE — 88309 TISSUE EXAM BY PATHOLOGIST: CPT | Mod: 26 | Performed by: STUDENT IN AN ORGANIZED HEALTH CARE EDUCATION/TRAINING PROGRAM

## 2024-09-19 PROCEDURE — 88311 DECALCIFY TISSUE: CPT | Mod: 26 | Performed by: STUDENT IN AN ORGANIZED HEALTH CARE EDUCATION/TRAINING PROGRAM

## 2024-09-19 PROCEDURE — 250N000025 HC SEVOFLURANE, PER MIN: Performed by: OBSTETRICS & GYNECOLOGY

## 2024-09-19 PROCEDURE — 258N000003 HC RX IP 258 OP 636

## 2024-09-19 PROCEDURE — 250N000013 HC RX MED GY IP 250 OP 250 PS 637

## 2024-09-19 PROCEDURE — 0DN84ZZ RELEASE SMALL INTESTINE, PERCUTANEOUS ENDOSCOPIC APPROACH: ICD-10-PCS | Performed by: OBSTETRICS & GYNECOLOGY

## 2024-09-19 PROCEDURE — 85610 PROTHROMBIN TIME: CPT

## 2024-09-19 PROCEDURE — 710N000011 HC RECOVERY PHASE 1, LEVEL 3, PER MIN: Performed by: OBSTETRICS & GYNECOLOGY

## 2024-09-19 PROCEDURE — 360N000077 HC SURGERY LEVEL 4, PER MIN: Performed by: OBSTETRICS & GYNECOLOGY

## 2024-09-19 PROCEDURE — 0DB84ZZ EXCISION OF SMALL INTESTINE, PERCUTANEOUS ENDOSCOPIC APPROACH: ICD-10-PCS | Performed by: OBSTETRICS & GYNECOLOGY

## 2024-09-19 PROCEDURE — 258N000003 HC RX IP 258 OP 636: Performed by: NURSE ANESTHETIST, CERTIFIED REGISTERED

## 2024-09-19 PROCEDURE — 0UT94ZZ RESECTION OF UTERUS, PERCUTANEOUS ENDOSCOPIC APPROACH: ICD-10-PCS | Performed by: OBSTETRICS & GYNECOLOGY

## 2024-09-19 PROCEDURE — 88341 IMHCHEM/IMCYTCHM EA ADD ANTB: CPT | Mod: TC | Performed by: OBSTETRICS & GYNECOLOGY

## 2024-09-19 PROCEDURE — 0UT74ZZ RESECTION OF BILATERAL FALLOPIAN TUBES, PERCUTANEOUS ENDOSCOPIC APPROACH: ICD-10-PCS | Performed by: OBSTETRICS & GYNECOLOGY

## 2024-09-19 PROCEDURE — 120N000002 HC R&B MED SURG/OB UMMC

## 2024-09-19 PROCEDURE — 88342 IMHCHEM/IMCYTCHM 1ST ANTB: CPT | Mod: 26 | Performed by: STUDENT IN AN ORGANIZED HEALTH CARE EDUCATION/TRAINING PROGRAM

## 2024-09-19 PROCEDURE — 999N000141 HC STATISTIC PRE-PROCEDURE NURSING ASSESSMENT: Performed by: OBSTETRICS & GYNECOLOGY

## 2024-09-19 PROCEDURE — P9059 PLASMA, FRZ BETWEEN 8-24HOUR: HCPCS

## 2024-09-19 PROCEDURE — 250N000013 HC RX MED GY IP 250 OP 250 PS 637: Performed by: STUDENT IN AN ORGANIZED HEALTH CARE EDUCATION/TRAINING PROGRAM

## 2024-09-19 PROCEDURE — 36415 COLL VENOUS BLD VENIPUNCTURE: CPT

## 2024-09-19 PROCEDURE — 250N000011 HC RX IP 250 OP 636: Performed by: OBSTETRICS & GYNECOLOGY

## 2024-09-19 RX ORDER — PROPOFOL 10 MG/ML
INJECTION, EMULSION INTRAVENOUS PRN
Status: DISCONTINUED | OUTPATIENT
Start: 2024-09-19 | End: 2024-09-19

## 2024-09-19 RX ORDER — FENTANYL CITRATE 50 UG/ML
INJECTION, SOLUTION INTRAMUSCULAR; INTRAVENOUS PRN
Status: DISCONTINUED | OUTPATIENT
Start: 2024-09-19 | End: 2024-09-19

## 2024-09-19 RX ORDER — ACETAMINOPHEN 325 MG/1
650 TABLET ORAL EVERY 6 HOURS
Status: DISCONTINUED | OUTPATIENT
Start: 2024-09-19 | End: 2024-09-19

## 2024-09-19 RX ORDER — FEXOFENADINE HCL 60 MG/1
60 TABLET, FILM COATED ORAL DAILY PRN
Status: DISCONTINUED | OUTPATIENT
Start: 2024-09-19 | End: 2024-09-24 | Stop reason: HOSPADM

## 2024-09-19 RX ORDER — AMOXICILLIN 250 MG
1 CAPSULE ORAL 2 TIMES DAILY
Status: DISCONTINUED | OUTPATIENT
Start: 2024-09-19 | End: 2024-09-19

## 2024-09-19 RX ORDER — SIMETHICONE 80 MG
80 TABLET,CHEWABLE ORAL 4 TIMES DAILY PRN
Status: DISCONTINUED | OUTPATIENT
Start: 2024-09-19 | End: 2024-09-24 | Stop reason: HOSPADM

## 2024-09-19 RX ORDER — HYDROMORPHONE HCL IN WATER/PF 6 MG/30 ML
0.4 PATIENT CONTROLLED ANALGESIA SYRINGE INTRAVENOUS EVERY 5 MIN PRN
Status: DISCONTINUED | OUTPATIENT
Start: 2024-09-19 | End: 2024-09-19

## 2024-09-19 RX ORDER — SODIUM CHLORIDE, SODIUM GLUCONATE, SODIUM ACETATE, POTASSIUM CHLORIDE AND MAGNESIUM CHLORIDE 526; 502; 368; 37; 30 MG/100ML; MG/100ML; MG/100ML; MG/100ML; MG/100ML
INJECTION, SOLUTION INTRAVENOUS CONTINUOUS PRN
Status: DISCONTINUED | OUTPATIENT
Start: 2024-09-19 | End: 2024-09-19

## 2024-09-19 RX ORDER — AMOXICILLIN 250 MG
2 CAPSULE ORAL 2 TIMES DAILY
Status: DISCONTINUED | OUTPATIENT
Start: 2024-09-19 | End: 2024-09-19

## 2024-09-19 RX ORDER — WARFARIN SODIUM 2 MG/1
2 TABLET ORAL DAILY
Status: DISCONTINUED | OUTPATIENT
Start: 2024-09-19 | End: 2024-09-19

## 2024-09-19 RX ORDER — OXYCODONE HYDROCHLORIDE 10 MG/1
10 TABLET ORAL EVERY 4 HOURS PRN
Status: DISCONTINUED | OUTPATIENT
Start: 2024-09-19 | End: 2024-09-19

## 2024-09-19 RX ORDER — OXYCODONE HYDROCHLORIDE 5 MG/1
5 TABLET ORAL EVERY 4 HOURS PRN
Status: DISCONTINUED | OUTPATIENT
Start: 2024-09-19 | End: 2024-09-24 | Stop reason: HOSPADM

## 2024-09-19 RX ORDER — ONDANSETRON 4 MG/1
4 TABLET, ORALLY DISINTEGRATING ORAL EVERY 30 MIN PRN
Status: DISCONTINUED | OUTPATIENT
Start: 2024-09-19 | End: 2024-09-19

## 2024-09-19 RX ORDER — DEXAMETHASONE SODIUM PHOSPHATE 4 MG/ML
4 INJECTION, SOLUTION INTRA-ARTICULAR; INTRALESIONAL; INTRAMUSCULAR; INTRAVENOUS; SOFT TISSUE
Status: DISCONTINUED | OUTPATIENT
Start: 2024-09-19 | End: 2024-09-19

## 2024-09-19 RX ORDER — NALOXONE HYDROCHLORIDE 0.4 MG/ML
0.4 INJECTION, SOLUTION INTRAMUSCULAR; INTRAVENOUS; SUBCUTANEOUS
Status: DISCONTINUED | OUTPATIENT
Start: 2024-09-19 | End: 2024-09-19 | Stop reason: HOSPADM

## 2024-09-19 RX ORDER — HEPARIN SODIUM 5000 [USP'U]/.5ML
5000 INJECTION, SOLUTION INTRAVENOUS; SUBCUTANEOUS
Status: COMPLETED | OUTPATIENT
Start: 2024-09-19 | End: 2024-09-19

## 2024-09-19 RX ORDER — LIDOCAINE HYDROCHLORIDE 20 MG/ML
INJECTION, SOLUTION INFILTRATION; PERINEURAL PRN
Status: DISCONTINUED | OUTPATIENT
Start: 2024-09-19 | End: 2024-09-19

## 2024-09-19 RX ORDER — NALOXONE HYDROCHLORIDE 0.4 MG/ML
0.1 INJECTION, SOLUTION INTRAMUSCULAR; INTRAVENOUS; SUBCUTANEOUS
Status: DISCONTINUED | OUTPATIENT
Start: 2024-09-19 | End: 2024-09-19

## 2024-09-19 RX ORDER — FENTANYL CITRATE 50 UG/ML
50 INJECTION, SOLUTION INTRAMUSCULAR; INTRAVENOUS EVERY 5 MIN PRN
Status: DISCONTINUED | OUTPATIENT
Start: 2024-09-19 | End: 2024-09-19

## 2024-09-19 RX ORDER — NALOXONE HYDROCHLORIDE 0.4 MG/ML
0.2 INJECTION, SOLUTION INTRAMUSCULAR; INTRAVENOUS; SUBCUTANEOUS
Status: DISCONTINUED | OUTPATIENT
Start: 2024-09-19 | End: 2024-09-19 | Stop reason: HOSPADM

## 2024-09-19 RX ORDER — LIDOCAINE 40 MG/G
CREAM TOPICAL
Status: DISCONTINUED | OUTPATIENT
Start: 2024-09-19 | End: 2024-09-24 | Stop reason: HOSPADM

## 2024-09-19 RX ORDER — SODIUM CHLORIDE, SODIUM LACTATE, POTASSIUM CHLORIDE, CALCIUM CHLORIDE 600; 310; 30; 20 MG/100ML; MG/100ML; MG/100ML; MG/100ML
INJECTION, SOLUTION INTRAVENOUS CONTINUOUS
Status: DISCONTINUED | OUTPATIENT
Start: 2024-09-19 | End: 2024-09-19

## 2024-09-19 RX ORDER — OXYCODONE HYDROCHLORIDE 5 MG/1
5 TABLET ORAL EVERY 4 HOURS PRN
Status: DISCONTINUED | OUTPATIENT
Start: 2024-09-19 | End: 2024-09-19

## 2024-09-19 RX ORDER — PHENAZOPYRIDINE HYDROCHLORIDE 200 MG/1
200 TABLET, FILM COATED ORAL ONCE
Status: COMPLETED | OUTPATIENT
Start: 2024-09-19 | End: 2024-09-19

## 2024-09-19 RX ORDER — ONDANSETRON 2 MG/ML
4 INJECTION INTRAMUSCULAR; INTRAVENOUS EVERY 30 MIN PRN
Status: DISCONTINUED | OUTPATIENT
Start: 2024-09-19 | End: 2024-09-19

## 2024-09-19 RX ORDER — HYDRALAZINE HYDROCHLORIDE 50 MG/1
50 TABLET, FILM COATED ORAL 2 TIMES DAILY
Status: DISCONTINUED | OUTPATIENT
Start: 2024-09-19 | End: 2024-09-24

## 2024-09-19 RX ORDER — FENTANYL CITRATE 50 UG/ML
25-50 INJECTION, SOLUTION INTRAMUSCULAR; INTRAVENOUS
Status: DISCONTINUED | OUTPATIENT
Start: 2024-09-19 | End: 2024-09-19 | Stop reason: HOSPADM

## 2024-09-19 RX ORDER — HYDROMORPHONE HCL IN WATER/PF 6 MG/30 ML
0.2 PATIENT CONTROLLED ANALGESIA SYRINGE INTRAVENOUS EVERY 5 MIN PRN
Status: DISCONTINUED | OUTPATIENT
Start: 2024-09-19 | End: 2024-09-19

## 2024-09-19 RX ORDER — FENTANYL CITRATE 50 UG/ML
25 INJECTION, SOLUTION INTRAMUSCULAR; INTRAVENOUS EVERY 5 MIN PRN
Status: DISCONTINUED | OUTPATIENT
Start: 2024-09-19 | End: 2024-09-19

## 2024-09-19 RX ORDER — BISACODYL 10 MG
10 SUPPOSITORY, RECTAL RECTAL DAILY
Status: DISCONTINUED | OUTPATIENT
Start: 2024-09-20 | End: 2024-09-24 | Stop reason: HOSPADM

## 2024-09-19 RX ORDER — FLUMAZENIL 0.1 MG/ML
0.2 INJECTION, SOLUTION INTRAVENOUS
Status: DISCONTINUED | OUTPATIENT
Start: 2024-09-19 | End: 2024-09-19 | Stop reason: HOSPADM

## 2024-09-19 RX ORDER — METRONIDAZOLE 500 MG/100ML
500 INJECTION, SOLUTION INTRAVENOUS ONCE
Status: COMPLETED | OUTPATIENT
Start: 2024-09-19 | End: 2024-09-19

## 2024-09-19 RX ORDER — ONDANSETRON 2 MG/ML
INJECTION INTRAMUSCULAR; INTRAVENOUS PRN
Status: DISCONTINUED | OUTPATIENT
Start: 2024-09-19 | End: 2024-09-19

## 2024-09-19 RX ADMIN — ONDANSETRON 4 MG: 2 INJECTION INTRAMUSCULAR; INTRAVENOUS at 11:51

## 2024-09-19 RX ADMIN — DISOPYRAMIDE PHOSPHATE 100 MG: 100 CAPSULE, GELATIN COATED ORAL at 18:02

## 2024-09-19 RX ADMIN — FENTANYL CITRATE 50 MCG: 50 INJECTION, SOLUTION INTRAMUSCULAR; INTRAVENOUS at 07:02

## 2024-09-19 RX ADMIN — SODIUM CHLORIDE, SODIUM GLUCONATE, SODIUM ACETATE, POTASSIUM CHLORIDE AND MAGNESIUM CHLORIDE: 526; 502; 368; 37; 30 INJECTION, SOLUTION INTRAVENOUS at 07:36

## 2024-09-19 RX ADMIN — METRONIDAZOLE 500 MG: 5 INJECTION, SOLUTION INTRAVENOUS at 08:10

## 2024-09-19 RX ADMIN — DISOPYRAMIDE PHOSPHATE 100 MG: 100 CAPSULE, GELATIN COATED ORAL at 00:32

## 2024-09-19 RX ADMIN — LIDOCAINE HYDROCHLORIDE 100 MG: 20 INJECTION, SOLUTION INFILTRATION; PERINEURAL at 07:59

## 2024-09-19 RX ADMIN — HYDROMORPHONE HYDROCHLORIDE 0.5 MG: 1 INJECTION, SOLUTION INTRAMUSCULAR; INTRAVENOUS; SUBCUTANEOUS at 12:22

## 2024-09-19 RX ADMIN — FENTANYL CITRATE 50 MCG: 50 INJECTION INTRAMUSCULAR; INTRAVENOUS at 12:24

## 2024-09-19 RX ADMIN — FENTANYL CITRATE 50 MCG: 50 INJECTION INTRAMUSCULAR; INTRAVENOUS at 07:51

## 2024-09-19 RX ADMIN — FAMOTIDINE 20 MG: 20 TABLET ORAL at 21:36

## 2024-09-19 RX ADMIN — ACETAMINOPHEN 650 MG: 325 TABLET ORAL at 14:33

## 2024-09-19 RX ADMIN — SUGAMMADEX 100 MG: 100 INJECTION, SOLUTION INTRAVENOUS at 12:01

## 2024-09-19 RX ADMIN — FENTANYL CITRATE 100 MCG: 50 INJECTION INTRAMUSCULAR; INTRAVENOUS at 08:52

## 2024-09-19 RX ADMIN — PROPOFOL 20 MG: 10 INJECTION, EMULSION INTRAVENOUS at 08:03

## 2024-09-19 RX ADMIN — Medication 10 MG: at 08:49

## 2024-09-19 RX ADMIN — ACETAMINOPHEN 975 MG: 325 TABLET ORAL at 01:03

## 2024-09-19 RX ADMIN — PHENYLEPHRINE HYDROCHLORIDE 100 MCG: 10 INJECTION INTRAVENOUS at 11:50

## 2024-09-19 RX ADMIN — FAMOTIDINE 20 MG: 10 INJECTION, SOLUTION INTRAVENOUS at 01:14

## 2024-09-19 RX ADMIN — PHENAZOPYRIDINE 200 MG: 200 TABLET ORAL at 07:11

## 2024-09-19 RX ADMIN — Medication 10 MG: at 10:33

## 2024-09-19 RX ADMIN — OXYCODONE HYDROCHLORIDE 10 MG: 10 TABLET ORAL at 14:33

## 2024-09-19 RX ADMIN — OXYCODONE HYDROCHLORIDE 10 MG: 10 TABLET ORAL at 00:32

## 2024-09-19 RX ADMIN — OXYCODONE HYDROCHLORIDE 10 MG: 10 TABLET ORAL at 19:21

## 2024-09-19 RX ADMIN — HYDRALAZINE HYDROCHLORIDE 50 MG: 50 TABLET ORAL at 21:37

## 2024-09-19 RX ADMIN — PROPOFOL 50 MG: 10 INJECTION, EMULSION INTRAVENOUS at 07:59

## 2024-09-19 RX ADMIN — PREGABALIN 100 MG: 50 CAPSULE ORAL at 21:32

## 2024-09-19 RX ADMIN — ATENOLOL 50 MG: 50 TABLET ORAL at 21:32

## 2024-09-19 RX ADMIN — HEPARIN SODIUM 5000 UNITS: 5000 INJECTION, SOLUTION INTRAVENOUS; SUBCUTANEOUS at 07:02

## 2024-09-19 RX ADMIN — SODIUM CHLORIDE, SODIUM GLUCONATE, SODIUM ACETATE, POTASSIUM CHLORIDE AND MAGNESIUM CHLORIDE: 526; 502; 368; 37; 30 INJECTION, SOLUTION INTRAVENOUS at 08:42

## 2024-09-19 RX ADMIN — SUGAMMADEX 100 MG: 100 INJECTION, SOLUTION INTRAVENOUS at 11:56

## 2024-09-19 RX ADMIN — GENTAMICIN SULFATE 340 MG: 40 INJECTION, SOLUTION INTRAMUSCULAR; INTRAVENOUS at 07:08

## 2024-09-19 RX ADMIN — FENTANYL CITRATE 50 MCG: 50 INJECTION INTRAMUSCULAR; INTRAVENOUS at 07:45

## 2024-09-19 RX ADMIN — Medication 10 MG: at 09:38

## 2024-09-19 RX ADMIN — Medication 40 MG: at 08:00

## 2024-09-19 RX ADMIN — Medication 10 MG: at 10:08

## 2024-09-19 RX ADMIN — NALOXONE HYDROCHLORIDE 0.2 MG: 0.4 INJECTION, SOLUTION INTRAMUSCULAR; INTRAVENOUS; SUBCUTANEOUS at 22:13

## 2024-09-19 ASSESSMENT — ACTIVITIES OF DAILY LIVING (ADL)
ADLS_ACUITY_SCORE: 34
ADLS_ACUITY_SCORE: 32
ADLS_ACUITY_SCORE: 32
ADLS_ACUITY_SCORE: 34
ADLS_ACUITY_SCORE: 32
ADLS_ACUITY_SCORE: 34
ADLS_ACUITY_SCORE: 32
ADLS_ACUITY_SCORE: 32
ADLS_ACUITY_SCORE: 34
ADLS_ACUITY_SCORE: 34
ADLS_ACUITY_SCORE: 26
ADLS_ACUITY_SCORE: 34
ADLS_ACUITY_SCORE: 32
ADLS_ACUITY_SCORE: 34
ADLS_ACUITY_SCORE: 34
ADLS_ACUITY_SCORE: 18

## 2024-09-19 ASSESSMENT — ENCOUNTER SYMPTOMS: DYSRHYTHMIAS: 1

## 2024-09-19 NOTE — ANESTHESIA PREPROCEDURE EVALUATION
Anesthesia Pre-Procedure Evaluation    Patient: Hortensia Xie   MRN: [id number] : 1940        Procedure : Procedure(s):  Total laparoscopic hysterectomy, bilateral salpingo-oophorectomy          Past Medical History:   Diagnosis Date    Anticoagulated on warfarin     Cardiac pacemaker in situ     Chronic atrial fibrillation (H)     Ablation in     Chronic, continuous use of opioids     Chronic pain from stroke    Endometrial cancer (H)     HTN (hypertension)     Stroke, embolic (H)     Residual right sided weakness    Type 2 diabetes mellitus (H)     Declines medication      Past Surgical History:   Procedure Laterality Date     SECTION      COLECTOMY PARTIAL      Primary anastomosis, due to diverticulitis?    IMPLANT PACEMAKER        Allergies   Allergen Reactions    Penicillins Anaphylaxis    Cephalosporins Hives     Reports 'cross-over'     Codeine Nausea and Nausea and Vomiting     PN: LW Reaction: VOMIT    Diltiazem      >3 second cardiac pauses    Hydrocodone Nausea and Vomiting    Hydrocodone-Acetaminophen Itching and Nausea    Ibuprofen Swelling and Unknown     PN: LW Reaction: EDEMA    Iodinated Contrast Media Difficulty breathing, Hives and Photosensitivity    Nsaids Difficulty breathing and Photosensitivity    Potassium Unknown     PN: LW Reaction: STOMACH PAIN    Amlodipine Other (See Comments), Itching and Rash    Clindamycin Difficulty breathing and Rash    Irbesartan Itching and Rash     heart racing, stomach upset    Lisinopril Rash    Olmesartan Rash      Social History     Tobacco Use    Smoking status: Never    Smokeless tobacco: Never   Substance Use Topics    Alcohol use: Not on file      Wt Readings from Last 1 Encounters:   24 80.2 kg (176 lb 14.4 oz)        Anesthesia Evaluation   Pt has had prior anesthetic.         ROS/MED HX  ENT/Pulmonary:       Neurologic:     (+)          CVA,                      Cardiovascular:     (+)  hypertension- -   -  - -               "pacemaker,          dysrhythmias, a-fib,             METS/Exercise Tolerance:     Hematologic:     (+)      anemia,          Musculoskeletal:       GI/Hepatic:       Renal/Genitourinary:       Endo:     (+)  type II DM,   Not using insulin,                 Psychiatric/Substance Use:       Infectious Disease:  - neg infectious disease ROS     Malignancy:       Other:  - neg other ROS          Physical Exam    Airway        Mallampati: II   TM distance: > 3 FB   Neck ROM: full   Mouth opening: > 3 cm    Respiratory Devices and Support         Dental       (+) Edentulous      Cardiovascular   cardiovascular exam normal          Pulmonary                   OUTSIDE LABS:  CBC:   Lab Results   Component Value Date    WBC 7.2 09/19/2024    WBC 8.5 09/18/2024    HGB 11.3 (L) 09/19/2024    HGB 10.9 (L) 09/18/2024    HCT 35.1 09/19/2024    HCT 33.8 (L) 09/18/2024     09/19/2024     09/18/2024     BMP:   Lab Results   Component Value Date     09/18/2024     04/24/2007    POTASSIUM 4.4 09/18/2024    POTASSIUM 4.5 04/24/2007    CHLORIDE 100 09/18/2024    CHLORIDE 96 04/24/2007    CO2 24 09/18/2024    CO2 29 04/24/2007    BUN 11.7 09/18/2024    BUN 12 04/24/2007    CR 0.76 09/18/2024    CR 0.78 04/24/2007    GLC 90 09/18/2024     (H) 09/18/2024     COAGS:   Lab Results   Component Value Date    PTT 24 09/18/2024    INR 1.56 (H) 09/19/2024     POC: No results found for: \"BGM\", \"HCG\", \"HCGS\"  HEPATIC:   Lab Results   Component Value Date    ALBUMIN 3.6 09/18/2024    PROTTOTAL 7.4 09/18/2024    ALT 10 09/18/2024    AST 38 09/18/2024    ALKPHOS 106 09/18/2024    BILITOTAL 0.3 09/18/2024     OTHER:   Lab Results   Component Value Date    GIOVANNI 9.2 09/18/2024       Anesthesia Plan    ASA Status:  3       Anesthesia Type: General.   Induction: Intravenous.      Techniques and Equipment:     - Lines/Monitors: 2nd IV, Arterial Line     Consents    Anesthesia Plan(s) and associated risks, benefits, and " "realistic alternatives discussed. Questions answered and patient/representative(s) expressed understanding.     - Discussed:     - Discussed with:  Patient      - Extended Intubation/Ventilatory Support Discussed: No.      - Patient is DNR/DNI Status: No     Use of blood products discussed: Yes.     Postoperative Care            Comments:    Other Comments: Patient very emotional with some ?delirium although clearly oriented and responsive to questions.  May be a very difficult PACU issue.  Pt did not want preop block - OK postop rescue.             Jaquan Camejo MD    I have reviewed the pertinent notes and labs in the chart from the past 30 days and (re)examined the patient.  Any updates or changes from those notes are reflected in this note.     # Hyponatremia: Lowest Na = 135 mmol/L in last 30 days, will monitor as appropriate        # Drug Induced Coagulation Defect: home medication list includes an anticoagulant medication   # Overweight: Estimated body mass index is 28.55 kg/m  as calculated from the following:    Height as of this encounter: 1.676 m (5' 6\").    Weight as of this encounter: 80.2 kg (176 lb 14.4 oz).      "

## 2024-09-19 NOTE — PLAN OF CARE
A&Ox4, HTN w/in parameters. Pain managed w/ one time dose IV dilaudid when she got up to the floor, otherwise oxycodone Q4 prn. Brief changed x2 due to bleeding and incontinence, small to moderate clots. Pt appears to not know when she has to void due to the pain. Pt independent at home, did not get up for me. Blood sugars ACHS. NPO @ 0000 for hysterectomy/BSO tomorrow. PIVx1 saline locked. Ate small amount of dinner. Pt said she can only eat soft foods due to teeth issues. +1 edema to BLE.

## 2024-09-19 NOTE — PROGRESS NOTES
Gynecologic Oncology Postoperative Progress Note  9/19/2024    S: Patient returned to floor at 1440, seen at 1600. Patient is still quite sleepy and a bit disoriented, says that she's not quite sure where she is but does understand she just had surgery. Reports pain is significant and that she doesn't remember her other surgeries being this bad. Drinking water, sitting partway up in bed.    O:  Vitals:    09/19/24 1345 09/19/24 1440 09/19/24 1500 09/19/24 1515   BP: 132/55 135/46 (!) 142/50 129/51   BP Location:       Pulse: 71      Resp: 16 16     Temp: 98.7  F (37.1  C)      TempSrc:       SpO2: 93% 93% 96% 96%   Weight:       Height:           Gen: Sitting in bed, uncomfortable  Cardio: RRR, no murmurs  Resp: CTAB, non-labored breathing  Abdomen: Soft, appropriately tender, incisions clean and dry  Extremities: Non-tender, 1+ edema    A: 84 year old POD#0 s/p TLH, BSO, LEIA, vaginal laceration repair, excision of small bowel nodule, who appears to still be waking up from anesthesia.     # Vaginal bleeding  # Endometrial cancer  # Postoperative state    Dz: FIGO 1 endometrioid endometrial adenocarcinoma  FEN: ADAT  Pain: Tylenol and Lyrica scheduled, oxycodone PRN. Last received at 1433.  Heme: Hgb 11.3 > . AM Hemoglobin to be collected  CV:  Sinus node dysfunction, PTA disopyramide, afib PTA chronic warfarin [held]; Cardiac pacemaker. HTN, PTA atenolol, and hydralazine [held]. See below.  GI: Hx partial colectomy. Prn senna-docusate. Prn antiemetics.  : s/p solis, has not yet voided  Neuro/Psych: Hx stroke; Hx anxiety, no meds  PPx: Warfarin held. SCDs, IS.    # Afib  # Pacemaker in situ  - Warfarin held for surgery, plan to restart POD#1  - Pharmacy to dose warfarin; consult placed, appreciate recs. INR goal 2-2.5  - Continue PTA norpace    # T2DM  - Not on meds at home, POCT glucose checks QID.  - SSI held per pharmacy; sugars have been relatively good so far and only would have required 1 unit. Will  unhold PRN.  - Hypoglycemia precautions    # HTN  - Will restart PTA atenolol and hydralazine    # History of stroke  # Chronic pain 2/2 stroke  - PTA Lyrica continued in house  - Mary Tylenol, PRN oxycodone for pain - patient takes Percocet at home.  - Fall, delerium precautions in place    # Hypothyroidism  - PTA levothyroxine    Dispo: Pending postoperative recovery and safe discharge plans    Junior Robertson MD, MSc  Gynecologic Oncology, PGY-1  09/19/2024 4:00 PM    To reach the Gynecologic Oncology resident, please page [phone]

## 2024-09-19 NOTE — BRIEF OP NOTE
[facility]    Brief Operative Note    Pre-operative diagnosis: Endometrial cancer (H) [C54.1]  Post-operative diagnosis Same as pre-operative diagnosis    Procedure: Total laparoscopic hysterectomy, bilateral salpingo-oophorectomy, lysis of adhesions for greater than 60 minutes, Repair of Vaginal Laceration, Excision of Small Bowel Nodule, Bilateral - Abdomen    Surgeon: Surgeons and Role:     * Jameel Miller MD - Primary     * Joceline Avila MD - Resident - Assisting     * Kathleen Bonilla MD - Resident - Assisting  Anesthesia: General with Block   Estimated Blood Loss: 200cc    Drains: None  Specimens:   ID Type Source Tests Collected by Time Destination   1 : Uterus, Cervix, Bilateral Fallopian Tubes & Ovaries Tissue Uterus, Cervix, Bilateral Fallopian Tubes & Ovaries SURGICAL PATHOLOGY EXAM Jameel Miller MD 9/19/2024 10:33 AM    2 : Small Bowel Nodule Tissue Small Intestine SURGICAL PATHOLOGY EXAM Jameel Miller MD 9/19/2024 11:17 AM      Findings:   On EUA, normal appearing, atrophic external genitalia. On bimanual, enlarged, mobile uterus, with notable uterine bleeding. On laparoscopy at [place], no injury on entry however mild subcutaneous insufflation.  Omental adhesions at the midline supraumbilically to the pelvis. Liver cirrhotic appearing. No evidence of metastatic disease in the upper abdomen. On pelvic survey, enlarged, bulky uterus . Small bowel mesentery adherent to posterior uterine fundus with approximately 1cm size firm nodule that was excised and sent for pathology. Moderate pelvic adhesions to the left pelvic sidewall. Filmy bladder adhesions. Bilateral ureters visualized and away from surgical site. Surgical pedicles hemostatic at the end of the case. On vaginal examination, sulcal and 2nd degree laceration repaired with 2-0 and 3-0 vicryl and noted to be hemostatic.   Complications: None.  Implants: * No implants in log *

## 2024-09-19 NOTE — ANESTHESIA POSTPROCEDURE EVALUATION
Patient: Hortensia Xie    Procedure: Procedure(s):  Total laparoscopic hysterectomy, bilateral salpingo-oophorectomy, lysis of adhesions for greater than 60 minutes, Repair of Vaginal Laceration, Excision of Small Bowel Nodule       Anesthesia Type:  No value filed.    Note:  Disposition: Inpatient   Postop Pain Control: Uneventful            Sign Out: Well controlled pain   PONV: No   Neuro/Psych: Uneventful            Sign Out: Acceptable/Baseline neuro status   Airway/Respiratory: Uneventful            Sign Out: Acceptable/Baseline resp. status   CV/Hemodynamics: Uneventful            Sign Out: Acceptable CV status; No obvious hypovolemia; No obvious fluid overload   Other NRE: NONE   DID A NON-ROUTINE EVENT OCCUR? No           Last vitals:  Vitals Value Taken Time   /55 09/19/24 1345   Temp     Pulse 73 09/19/24 1355   Resp 16 09/19/24 1355   SpO2 94 % 09/19/24 1355   Vitals shown include unfiled device data.    Electronically Signed By: Katya Maloney DO  September 19, 2024  1:56 PM

## 2024-09-19 NOTE — PHARMACY-ADMISSION MEDICATION HISTORY
Pharmacist Admission Medication History    Admission medication history is complete. The information provided in this note is only as accurate as the sources available at the time of the update.    Information Source(s): Spoke to patient at bedside; Reviewed medication dispense history ([facility]); Reviewed [place] ; Reviewed med list from [facility]     Changes made to PTA medication list:  Added:   Vitamin D (per patient)  Fexofenadine (per [facility] med list)   Deleted:   Clonidine (not on [facility] med list, no fill history)   Lorazepam (not [facility] med list, did not show up on  search - not filled within past year)   Changed:   Ascorbic acid - added frequency   Atenolol - added frequency (per fill history and [facility] med list)   Hydralazine - added frequency (per patient)   Percocet - changed from q6h to q8h (per fill history and patient report)   Pregabalin - changed from daily to BID (per patient and fill history)   Warfarin - updated instructions per [facility] med list     Pertinent information:  Hydralazine: Patient reported dose of 50 mg BID (1 whole tablet BID). [facility] med list reports 75 mg BID (1.5 tablets BID)  Warfarin: Managed by [facility]. Most recent dosing instructions from 8/30 are for 2 mg daily for INR goal of 2-3.   Results of [place]  search:  Oxy-APAP 5-325 mg #90 for 30 day supply last picked up 9/26/24  Pregabalin 100 mg #60 for 30 day supply last picked up 8/20/24    Prior to Admission medications    Medication Sig Last Dose Taking? Auth Provider Long Term End Date   ascorbic acid 1000 MG TABS tablet Take 1,000 mg by mouth daily. 9/18/2024 Yes Reported, Patient     atenolol (TENORMIN) 50 MG tablet Take 50 mg by mouth 2 times daily. 9/18/2024 Yes Reported, Patient Yes    disopyramide (NORPACE) 100 MG capsule Take 100 mg by mouth every 8 hours. 9/18/2024 Yes Reported, Patient Yes    fexofenadine (ALLEGRA) 60 MG tablet Take 60 mg by mouth daily as needed for allergies. Unknown Yes Unknown, Entered  By History     hydrALAZINE (APRESOLINE) 50 MG tablet Take 50 mg by mouth 2 times daily. 9/18/2024 Yes Reported, Patient Yes    levothyroxine (SYNTHROID/LEVOTHROID) 75 MCG tablet Take 75 mcg by mouth daily. 9/18/2024 Yes Reported, Patient Yes    Magnesium Oxide -Mg Supplement 500 MG TABS Take 1 tablet by mouth daily as needed. Unknown Yes Reported, Patient     oxyCODONE-acetaminophen (PERCOCET) 5-325 MG tablet Take 1 tablet by mouth every 8 hours as needed. 9/18/2024 Yes Reported, Patient     pregabalin (LYRICA) 100 MG capsule Take 100 mg by mouth 2 times daily. 9/18/2024 Yes Reported, Patient Yes    Vitamin D3 (VITAMIN D, CHOLECALCIFEROL,) 25 mcg (1000 units) tablet Take 1 tablet by mouth daily. Past Week Yes Unknown, Entered By History     warfarin ANTICOAGULANT (COUMADIN) 1 MG tablet Take 2 mg by mouth daily. Or as instructed by [facility] clinic. On HOLD Yes Unknown, Entered By History       Medication History Completed By: Yg Jean RPH 9/18/2024 7:17 PM

## 2024-09-19 NOTE — OR NURSING
Patient is able to converse oriented x4 and able to tell me my name and tell me she should not get out of bed without calling the nurse.

## 2024-09-19 NOTE — PHARMACY-ANTICOAGULATION SERVICE
Clinical Pharmacy - Warfarin Dosing Consult     Pharmacy has been consulted to manage this patient s warfarin therapy.  Indication: Atrial Fibrillation, history of stroke, cardiac pacemarker in situ   Therapy Goal: INR 2-2.5  Warfarin Prior to Admission: Yes  Warfarin PTA Regimen: 2 mg daily  Significant drug interactions: Metronidazole x1 9/19 @8am  Recent documented change in oral intake/nutrition: Unknown    INR   Date Value Ref Range Status   09/19/2024 1.56 (H) 0.85 - 1.15 Final   09/18/2024 1.41 (H) 0.85 - 1.15 Final       Recommend warfarin no dose today per primary team.  Starting tomorrow, would recommend 2 mg daily per pharmacist daily assessment.  Pharmacy will monitor Hortensia A Grams daily and order warfarin doses to achieve specified goal.      Please contact pharmacy as soon as possible if the warfarin needs to be held for a procedure or if the warfarin goals change.

## 2024-09-19 NOTE — ANESTHESIA PROCEDURE NOTES
Airway       Patient location during procedure: OR       Procedure Start/Stop Times: 9/19/2024 8:05 AM  Staff -        CRNA: Aurora Wisdom APRN CRNA       Performed By: CRNA  Consent for Airway        Urgency: elective  Indications and Patient Condition       Indications for airway management: jese-procedural       Induction type:intravenous       Mask difficulty assessment: 2 - vent by mask + OA or adjuvant +/- NMBA (size 8 oral airway.  Edentulous.)    Final Airway Details       Final airway type: endotracheal airway       Successful airway: ETT - single  Endotracheal Airway Details        ETT size (mm): 7.0       Cuffed: yes       Successful intubation technique: direct laryngoscopy       DL Blade Type: MAC 3       Grade View of Cords: 1       Adjucts: stylet       Position: Right       Measured from: lips       Secured at (cm): 23       Bite block used: None    Post intubation assessment        Placement verified by: capnometry, equal breath sounds and chest rise        Number of attempts at approach: 1       Secured with: silk tape       Ease of procedure: easy       Dentition: Unchanged    Medication(s) Administered   Medication Administration Time: 9/19/2024 8:05 AM    Additional Comments       Edentulous

## 2024-09-19 NOTE — PROGRESS NOTES
"Gynecology Oncology Progress Note  September 20, 2024    24H:  - Surgery  - Vertigo, disorientation    Subjective: Initially on entering room, patient confused about location. Upon discussion, patient oriented to person, place, time.     Reports dizziness and disorientation upon wakening that improves with time. Like earlier this evening, when waking all objects look upside down and the ceiling looks like it could be the floor. As she wakes, this improves. Pain well controlled. Denies fevers, chills, chest pain, SOB. Voiding without issue. Not yet passing gas. Tolerating water. Can't remember if she ate dinner.    Objective:   /54 (BP Location: Right arm)   Pulse 68   Temp 98.5  F (36.9  C) (Oral)   Resp 20   Ht 1.676 m (5' 6\")   Wt 80.2 kg (176 lb 14.4 oz)   SpO2 96%   BMI 28.55 kg/m      General: NAD  CV: RRR, well perfused  Resp: No increased work of breathing on room air  Abdomen: soft, nontender, mildly distended  Incisions: C/D/I  Extremities: nontender, no edema, left heel with bandage    I/Os  (Yesterday // Since Midnight)  PO: 280 mL // 0 mL  IV: 2660 mL // NR  Urine: 800 // 200  Blood: 200 // 0    New labs/imaging-   Latest Reference Range & Units 09/20/24 02:16   Sodium 135 - 145 mmol/L 135   Potassium 3.4 - 5.3 mmol/L 4.5   Chloride 98 - 107 mmol/L 100   Carbon Dioxide (CO2) 22 - 29 mmol/L 23   Urea Nitrogen 8.0 - 23.0 mg/dL 13.1   Creatinine 0.51 - 0.95 mg/dL 0.82   GFR Estimate >60 mL/min/1.73m2 71   Calcium 8.8 - 10.4 mg/dL 8.2 (L)   Anion Gap 7 - 15 mmol/L 12   Glucose 70 - 99 mg/dL 116 (H)   WBC 4.0 - 11.0 10e3/uL 13.1 (H)   Hemoglobin 11.7 - 15.7 g/dL 9.7 (L)   Hematocrit 35.0 - 47.0 % 30.0 (L)   Platelet Count 150 - 450 10e3/uL 211   RBC Count 3.80 - 5.20 10e6/uL 3.40 (L)   MCV 78 - 100 fL 88   MCH 26.5 - 33.0 pg 28.5   MCHC 31.5 - 36.5 g/dL 32.3   RDW 10.0 - 15.0 % 14.7   (L): Data is abnormally low  (H): Data is abnormally high      Assessment/Plan: Hortensia Xie is an 84 year old " with PMH of partial colectomy, stroke, sinus node dysfunction w/ cardiac pacemaker in place, atrial fibrillation on anti-coagulation, Type 2 diabetes, chronic pain on opioid therapy, and hypertension with newly diagnosed endometrial cancer, here with 4 days of vaginal bleeding with planned TLH, BSO later this morning. She was admitted for vaginal bleeding now HD#3 and POD#1 s/p TLH, BSO, LEIA, vag lac repair, excision of small bowel nodule.      # Vaginal bleeding  # Endometrial cancer  # Postoperative state  FEN: Regular diet  Pain: Scheduled tylenol and PTA lyrica, PRN oxy dose decreased due to dizziness and disorientation yesterday evening.  Heme: Hgb improved prior to surgery and surgery with minimal blood loss. AM CBC ordered. Monitor VS.  GI: Scheduled bowel regimen. PRN antiemetics.  : S/p solis, voiding w/o issues.    # Right heel wound  - WOC consult, appreciate recommendations     # Type 2 Diabetes, not on medication  - Patient has previously declined medication  - Sliding scale insulin held given mostly normal sugars, POCT glucose 4x daily while in house  - Hypoglycemia precautions     # HTN  - PTA atenolol continued in house  - PTA hydralazine restarted last night due to elevated blood pressures     # Atrial fibrillation  # Pacemaker in situ  - Pharmacy consulted for resumption of PTA warfarin, planned for later today.  - PTA disopyramide continued in house     # History of stroke  # Chronic pain 2/2 stroke  - PTA Lyrica continued in house  - Mary Tylenol, PRN oxycodone for pain - patient takes Percocet at home.  - Fall, delerium precautions in place     # Hypothyroidism  - PTA levothyroxine continued in house    Disposition: Dispo pending postoperative goals and PT/OT evaluation.    Zaid Velasquez MD MPH  Gynecologic Oncology, PGY-4  Pager [phone]

## 2024-09-19 NOTE — ANESTHESIA CARE TRANSFER NOTE
Patient: Hortensia Xie    Procedure: Procedure(s):  Total laparoscopic hysterectomy, bilateral salpingo-oophorectomy, lysis of adhesions for greater than 60 minutes, Repair of Vaginal Laceration, Excision of Small Bowel Nodule       Diagnosis: Endometrial cancer (H) [C54.1]  Diagnosis Additional Information: No value filed.    Anesthesia Type:   No value filed.     Note:    Oropharynx: oropharynx clear of all foreign objects and spontaneously breathing  Level of Consciousness: awake  Oxygen Supplementation: face mask  Level of Supplemental Oxygen (L/min / FiO2): 10  Independent Airway: airway patency satisfactory and stable  Dentition: dentition unchanged  Vital Signs Stable: post-procedure vital signs reviewed and stable  Report to RN Given: handoff report given  Patient transferred to: PACU    Handoff Report: Identifed the Patient, Identified the Reponsible Provider, Reviewed the pertinent medical history, Discussed the surgical course, Reviewed Intra-OP anesthesia mangement and issues during anesthesia, Set expectations for post-procedure period and Allowed opportunity for questions and acknowledgement of understanding      Vitals:  Vitals Value Taken Time    72    Temp 98.3    Pulse 75 09/19/24 1227   Resp 16 09/19/24 1227   SpO2 100 % 09/19/24 1227   Vitals shown include unfiled device data.    Electronically Signed By: Aurora Wisdom CRNA, MEAGAN SALTER  September 19, 2024  12:27 PM

## 2024-09-19 NOTE — PROGRESS NOTES
Received report from LUIS E Payne. Patient arrived from PACU around 1440. Patient still very drowsy, but oriented x4. Pain 10/10. PRN oxycodone given. VSS. Settled in room.

## 2024-09-19 NOTE — PLAN OF CARE
Plan of Care Reviewed With: patient    Overall Patient Progress: no change    Goal Outcome Evaluation ([id number]): Alert and oriented x4. Uterine cramping pain managed with Tylenol and oxycodone. Denies nausea. Voids spontaneously. Incontinent of urine x2. Small to moderate amount of vaginal bleeding present. Up with assist x1. Out of bed for weight on bedside scale, otherwise remained in bed overnight. Hypertensive, not within parameters to notify. OVSS on room air. NPO since 0000. Pt brought down to pre-op at 0610. Scrub x2 completed.

## 2024-09-19 NOTE — PLAN OF CARE
Nursing Focus: Admission    D: Patient admitted/transferred from ED for vaginal bleeding and cramping, hysterectomy/BSO scheduled 9/19.    I: Upon arrival to the unit patient was oriented to room, unit, and call light. Patient s height, weight, and vital signs were obtained. Allergies reviewed and allergy band applied. MD notified of patient s arrival on the unit. Adult AVS completed. Head to toe assessment completed. Education assessment completed. Care plan initiated.     A: Vital signs stable upon admission. Patient rates pain at 9/10. Two RN skin assessment completed Yes. Second RN was LUIS E Bean. Significant Skin Findings include small red spots to hairline, abrasion to L heel and R ankle. WO Nurse Consult Ordered Yes.     P: Continue to monitor patient s and intervene as needed. Continue with plan of care. Notify MD with any concerns or changes in patient status.

## 2024-09-20 ENCOUNTER — APPOINTMENT (OUTPATIENT)
Dept: SPEECH THERAPY | Facility: CLINIC | Age: 84
DRG: 740 | End: 2024-09-20
Attending: NURSE PRACTITIONER
Payer: COMMERCIAL

## 2024-09-20 ENCOUNTER — APPOINTMENT (OUTPATIENT)
Dept: OCCUPATIONAL THERAPY | Facility: CLINIC | Age: 84
DRG: 740 | End: 2024-09-20
Attending: OBSTETRICS & GYNECOLOGY
Payer: COMMERCIAL

## 2024-09-20 ENCOUNTER — APPOINTMENT (OUTPATIENT)
Dept: PHYSICAL THERAPY | Facility: CLINIC | Age: 84
DRG: 740 | End: 2024-09-20
Attending: OBSTETRICS & GYNECOLOGY
Payer: COMMERCIAL

## 2024-09-20 LAB
ANION GAP SERPL CALCULATED.3IONS-SCNC: 12 MMOL/L (ref 7–15)
BUN SERPL-MCNC: 13.1 MG/DL (ref 8–23)
CALCIUM SERPL-MCNC: 8.2 MG/DL (ref 8.8–10.4)
CHLORIDE SERPL-SCNC: 100 MMOL/L (ref 98–107)
CREAT SERPL-MCNC: 0.82 MG/DL (ref 0.51–0.95)
EGFRCR SERPLBLD CKD-EPI 2021: 71 ML/MIN/1.73M2
ERYTHROCYTE [DISTWIDTH] IN BLOOD BY AUTOMATED COUNT: 14.7 % (ref 10–15)
GLUCOSE BLDC GLUCOMTR-MCNC: 108 MG/DL (ref 70–99)
GLUCOSE BLDC GLUCOMTR-MCNC: 112 MG/DL (ref 70–99)
GLUCOSE BLDC GLUCOMTR-MCNC: 124 MG/DL (ref 70–99)
GLUCOSE BLDC GLUCOMTR-MCNC: 124 MG/DL (ref 70–99)
GLUCOSE BLDC GLUCOMTR-MCNC: 161 MG/DL (ref 70–99)
GLUCOSE SERPL-MCNC: 116 MG/DL (ref 70–99)
HCO3 SERPL-SCNC: 23 MMOL/L (ref 22–29)
HCT VFR BLD AUTO: 30 % (ref 35–47)
HGB BLD-MCNC: 9.7 G/DL (ref 11.7–15.7)
HOLD SPECIMEN: NORMAL
HOLD SPECIMEN: NORMAL
INR PPP: 1.53 (ref 0.85–1.15)
MCH RBC QN AUTO: 28.5 PG (ref 26.5–33)
MCHC RBC AUTO-ENTMCNC: 32.3 G/DL (ref 31.5–36.5)
MCV RBC AUTO: 88 FL (ref 78–100)
PLATELET # BLD AUTO: 211 10E3/UL (ref 150–450)
POTASSIUM SERPL-SCNC: 4.5 MMOL/L (ref 3.4–5.3)
RBC # BLD AUTO: 3.4 10E6/UL (ref 3.8–5.2)
SODIUM SERPL-SCNC: 135 MMOL/L (ref 135–145)
WBC # BLD AUTO: 13.1 10E3/UL (ref 4–11)

## 2024-09-20 PROCEDURE — 80048 BASIC METABOLIC PNL TOTAL CA: CPT

## 2024-09-20 PROCEDURE — 250N000013 HC RX MED GY IP 250 OP 250 PS 637

## 2024-09-20 PROCEDURE — 120N000002 HC R&B MED SURG/OB UMMC

## 2024-09-20 PROCEDURE — 250N000013 HC RX MED GY IP 250 OP 250 PS 637: Performed by: OBSTETRICS & GYNECOLOGY

## 2024-09-20 PROCEDURE — 97602 WOUND(S) CARE NON-SELECTIVE: CPT

## 2024-09-20 PROCEDURE — 92526 ORAL FUNCTION THERAPY: CPT | Mod: GN | Performed by: SPEECH-LANGUAGE PATHOLOGIST

## 2024-09-20 PROCEDURE — 92610 EVALUATE SWALLOWING FUNCTION: CPT | Mod: GN | Performed by: SPEECH-LANGUAGE PATHOLOGIST

## 2024-09-20 PROCEDURE — 250N000013 HC RX MED GY IP 250 OP 250 PS 637: Performed by: NURSE PRACTITIONER

## 2024-09-20 PROCEDURE — 36415 COLL VENOUS BLD VENIPUNCTURE: CPT | Performed by: OBSTETRICS & GYNECOLOGY

## 2024-09-20 PROCEDURE — 97535 SELF CARE MNGMENT TRAINING: CPT | Mod: GO

## 2024-09-20 PROCEDURE — G0463 HOSPITAL OUTPT CLINIC VISIT: HCPCS | Mod: 25

## 2024-09-20 PROCEDURE — 97116 GAIT TRAINING THERAPY: CPT | Mod: GP | Performed by: PHYSICAL THERAPIST

## 2024-09-20 PROCEDURE — 36415 COLL VENOUS BLD VENIPUNCTURE: CPT

## 2024-09-20 PROCEDURE — 97530 THERAPEUTIC ACTIVITIES: CPT | Mod: GP | Performed by: PHYSICAL THERAPIST

## 2024-09-20 PROCEDURE — 85027 COMPLETE CBC AUTOMATED: CPT

## 2024-09-20 PROCEDURE — 97165 OT EVAL LOW COMPLEX 30 MIN: CPT | Mod: GO

## 2024-09-20 PROCEDURE — 85610 PROTHROMBIN TIME: CPT | Performed by: OBSTETRICS & GYNECOLOGY

## 2024-09-20 PROCEDURE — 97161 PT EVAL LOW COMPLEX 20 MIN: CPT | Mod: GP | Performed by: PHYSICAL THERAPIST

## 2024-09-20 PROCEDURE — 250N000011 HC RX IP 250 OP 636: Performed by: OBSTETRICS & GYNECOLOGY

## 2024-09-20 RX ORDER — MECLIZINE HCL 12.5 MG 12.5 MG/1
12.5 TABLET ORAL 3 TIMES DAILY PRN
Status: DISCONTINUED | OUTPATIENT
Start: 2024-09-20 | End: 2024-09-24 | Stop reason: HOSPADM

## 2024-09-20 RX ORDER — WARFARIN SODIUM 2 MG/1
2 TABLET ORAL
Status: COMPLETED | OUTPATIENT
Start: 2024-09-20 | End: 2024-09-20

## 2024-09-20 RX ORDER — SALIVA STIMULANT COMB. NO.3
1-2 SPRAY, NON-AEROSOL (ML) MUCOUS MEMBRANE
Status: DISCONTINUED | OUTPATIENT
Start: 2024-09-20 | End: 2024-09-24 | Stop reason: HOSPADM

## 2024-09-20 RX ADMIN — PREGABALIN 100 MG: 50 CAPSULE ORAL at 08:59

## 2024-09-20 RX ADMIN — ACETAMINOPHEN 975 MG: 325 TABLET ORAL at 08:59

## 2024-09-20 RX ADMIN — LEVOTHYROXINE SODIUM 75 MCG: 75 TABLET ORAL at 08:59

## 2024-09-20 RX ADMIN — ATENOLOL 50 MG: 50 TABLET ORAL at 08:59

## 2024-09-20 RX ADMIN — ACETAMINOPHEN 975 MG: 325 TABLET ORAL at 19:26

## 2024-09-20 RX ADMIN — OXYCODONE HYDROCHLORIDE 2.5 MG: 5 TABLET ORAL at 14:17

## 2024-09-20 RX ADMIN — HYDRALAZINE HYDROCHLORIDE 50 MG: 50 TABLET ORAL at 20:38

## 2024-09-20 RX ADMIN — Medication 2 SPRAY: at 19:26

## 2024-09-20 RX ADMIN — DISOPYRAMIDE PHOSPHATE 100 MG: 100 CAPSULE, GELATIN COATED ORAL at 08:59

## 2024-09-20 RX ADMIN — FAMOTIDINE 20 MG: 20 TABLET ORAL at 08:59

## 2024-09-20 RX ADMIN — PREGABALIN 100 MG: 50 CAPSULE ORAL at 20:38

## 2024-09-20 RX ADMIN — OXYCODONE HYDROCHLORIDE 2.5 MG: 5 TABLET ORAL at 20:47

## 2024-09-20 RX ADMIN — PROCHLORPERAZINE EDISYLATE 5 MG: 5 INJECTION INTRAMUSCULAR; INTRAVENOUS at 01:56

## 2024-09-20 RX ADMIN — OXYCODONE HYDROCHLORIDE 2.5 MG: 5 TABLET ORAL at 08:58

## 2024-09-20 RX ADMIN — DISOPYRAMIDE PHOSPHATE 100 MG: 100 CAPSULE, GELATIN COATED ORAL at 16:47

## 2024-09-20 RX ADMIN — ACETAMINOPHEN 975 MG: 325 TABLET ORAL at 14:17

## 2024-09-20 RX ADMIN — HYDRALAZINE HYDROCHLORIDE 50 MG: 50 TABLET ORAL at 08:59

## 2024-09-20 RX ADMIN — ATENOLOL 50 MG: 50 TABLET ORAL at 20:37

## 2024-09-20 RX ADMIN — WARFARIN SODIUM 2 MG: 2 TABLET ORAL at 18:24

## 2024-09-20 RX ADMIN — DISOPYRAMIDE PHOSPHATE 100 MG: 100 CAPSULE, GELATIN COATED ORAL at 01:57

## 2024-09-20 RX ADMIN — FAMOTIDINE 20 MG: 20 TABLET ORAL at 20:38

## 2024-09-20 RX ADMIN — ACETAMINOPHEN 975 MG: 325 TABLET ORAL at 01:57

## 2024-09-20 ASSESSMENT — ACTIVITIES OF DAILY LIVING (ADL)
ADLS_ACUITY_SCORE: 34
DEPENDENT_IADLS:: CLEANING;COOKING;LAUNDRY;SHOPPING;TRANSPORTATION
ADLS_ACUITY_SCORE: 34

## 2024-09-20 NOTE — PLAN OF CARE
Goal Outcome Evaluation:    Plan of Care Reviewed With: patient    Overall Patient Progress: no changeOverall Patient Progress: no change      [phone]    POD#1    A&O x 4, a little off at times but calls appropriately, bed alarm on for safety   Afebrile, hypertensive but OVSS on RA  C/o abdominal/perineum/vaginal pain, pain was managed scheduled tylenol and PRN oxycodone x1   Denies n/v, dizziness, increased SOB and chest pain  Tolerating minced/moist & thin liquid diet, q4h I&O   BG checks = 161 and 112  x5 lap sites DASHA w/ liquid bandage, and heel abrasions covered w/ primapore   Incontinent of bladder at times, but voiding spontaneously and w/o difficulty using bedside commode  Passing gas (per pt), no BM during shift   Ax1-2 when out of bed and when using the commode  x2 PIV, both Sl'd        - plan is discharge to TCU pending TCU placement/availability (per MD)

## 2024-09-20 NOTE — CONSULTS
[facility] Nurse Inpatient Assessment     Consulted for: Left lower leg abrasions    Patient History (according to provider note(s):      Hortensia Xie is an 84 year old with PMH of partial colectomy, stroke, sinus node dysfunction w/ cardiac pacemaker in place, atrial fibrillation on anti-coagulation, Type 2 diabetes, chronic pain on opioid therapy, and hypertension with newly diagnosed endometrial cancer, here with 4 days of vaginal bleeding with planned TLH, BSO later this morning. She was admitted for vaginal bleeding now HD#3 and POD#1 s/p TLH, BSO, LEIA, vag lac repair, excision of small bowel nodule.     Assessment:      Areas visualized during today's visit: Focused: Left lower leg and foot  Wound location: Left lateral ankle and heel    Left lateral ankle 9/20    Left heel 9/20  Wound due to: Unknown Etiology  Wound history/plan of care: Wounds present on admission, patient unable to recall how she obtained the wounds or how long they have been there and seemed somewhat confused at initial visit on 9/20.   Wound base: 90 % Fibrin and Slough, 10 % Moist red tissue (unclear if dermis or granular at this point)     Palpation of the wound bed: normal      Drainage: small     Description of drainage: cloudy and yellow somewhat purulent appearing     Measurements (length x width x depth, in cm): Lateral ankle: 0.2 x 0.2 x 0 cm and 0.6 x 0.5 x 0.2 cm; Heel : 0.8 x 0.6 x 0.2 cm     Tunneling: N/A     Undermining: N/A  Periwound skin: Erythema- blanchable      Color: pink and red      Temperature: normal   Odor: none  Pain: moderate and during dressing change, sharp and tender  Pain interventions prior to dressing change: slow and gentle cares   Treatment goal: Infection control/prevention, Promote epidermal migration, Remove necrotic tissue, and Simplify plan of care  STATUS: initial assessment  Supplies ordered: gathered and at bedside      Treatment Plan:      Left lateral ankle and heel wound(s): Daily   Cleanse wounds with MicroKlenz wound cleanser and pat dry.  Apply nickel thick amount of Medihoney (#[id number]) on areas that will cover the wound bed to a Primapore dressing.  Apply to wound.   Keep heels lifted off bed.          Orders: Written    RECOMMEND PRIMARY TEAM ORDER: None, at this time  Education provided: plan of care and wound progress  Discussed plan of care with: Patient  [facility] nurse follow-up plan: weekly  Notify WOC if wound(s) deteriorate.  Nursing to notify the Provider(s) and re-consult the [facility] Nurse if new skin concern.    DATA:     Current support surface: Standard  Standard gel mattress (Isoflex)  Containment of urine/stool: Incontinent pad in bed  BMI: Body mass index is 28.55 kg/m .   Active diet order: Orders Placed This Encounter      Regular Diet Adult     Output: I/O last 3 completed shifts:  In: 2220 [P.O.:120; I.V.:2100]  Out: 1200 [Urine:1000; Blood:200]     Labs:   Recent Labs   Lab 09/20/24  0725 09/20/24  0216 09/19/24  0529 09/18/24  1055   ALBUMIN  --   --   --  3.6   HGB  --  9.7*   < > 10.9*   INR 1.53*  --    < > 1.41*   WBC  --  13.1*   < > 8.5    < > = values in this interval not displayed.     Pressure injury risk assessment:   Sensory Perception: 4-->no impairment  Moisture: 2-->very moist  Activity: 3-->walks occasionally  Mobility: 3-->slightly limited  Nutrition: 2-->probably inadequate  Friction and Shear: 2-->potential problem  Alex Score: 16    Juliana Cabral RN, CWOCN  Pager no longer is use, please contact through [place] group: [facility] Nurse [place]  Dept. Office Number: [phone]

## 2024-09-20 NOTE — PROGRESS NOTES
Physical Therapy Initial Evaluation     09/20/24 1115   Appointment Info   Signing Clinician's Name / Credentials (PT) Marco Antonio Tyson PT   Rehab Comments (PT) abd precs   Living Environment   People in Home alone   [place]  [facility]   Home Accessibility stairs within home;stairs to enter home   Number of Stairs, Main Entrance greater than 10 stairs   Stair Railings, Main Entrance railings safe and in good condition   Number of Stairs, Within Home, Primary greater than 10 stairs   Stair Railings, Within Home, Primary railings safe and in good condition   Living Environment Comments [place] [facility] [facility] and then 16 steps inside   Self-Care   Usual Activity Tolerance good   Current Activity Tolerance fair   Equipment Currently Used at Home walker, standard   Fall history within last six months no   Activity/Exercise/Self-Care Comment Reports ind PLOF with no AD, uses a 4WW out and about.   General Information   Onset of Illness/Injury or Date of Surgery 09/18/24   Referring Physician Jameel Miller MD   Patient/Family Therapy Goals Statement (PT) Increase strength   Pertinent History of Current Problem (include personal factors and/or comorbidities that impact the POC) Hortensia Xie is an 84 year old with PMH of partial colectomy, stroke, sinus node dysfunction w/ cardiac pacemaker in place, atrial fibrillation on anti-coagulation, Type 2 diabetes, chronic pain on opioid therapy, and hypertension with newly diagnosed endometrial cancer, here with 4 days of vaginal bleeding with planned TLH, BSO later this morning. She was admitted for vaginal bleeding now HD#3 and POD#1 s/p TLH, BSO, LEIA, vag lac repair, excision of small bowel nodule.   Existing Precautions/Restrictions fall;abdominal   Cognition   Affect/Mental Status (Cognition) WFL   Orientation Status (Cognition) oriented to;place;person;situation   Strength (Manual Muscle Testing)   Strength Comments demos antigravity  "strength in bilateral LE\"s but generalized weakness as demonstrated by assist required for transfers and mobility   Bed Mobility   Comment, (Bed Mobility) modA supine>sit   Transfers   Comment, (Transfers) Sit<> stand modA x1   Gait/Stairs (Locomotion)   Comment, (Gait/Stairs) Ambulates 4ft with FWW, very slow moving, shuffled gait, fatigued. Unable to ascend/descend stairs due to fatigue/weakness   Balance   Balance Comments heavily relies on FWW in standing. Initially requried maxA for EOB balance.   Clinical Impression   Criteria for Skilled Therapeutic Intervention Yes, treatment indicated   PT Diagnosis (PT) impaired functional mobility   Influenced by the following impairments weakness, deconditioning   Functional limitations due to impairments bed mobility, transfers, ambulation, stair negotiation.   Clinical Presentation (PT Evaluation Complexity) stable   Clinical Presentation Rationale clinical judgment   Clinical Decision Making (Complexity) low complexity   Planned Therapy Interventions (PT) balance training;bed mobility training;gait training;home exercise program;neuromuscular re-education;stair training;strengthening;transfer training   Risk & Benefits of therapy have been explained evaluation/treatment results reviewed;care plan/treatment goals reviewed;risks/benefits reviewed;current/potential barriers reviewed;participants voiced agreement with care plan;participants included;patient   PT Total Evaluation Time   PT Eval, Low Complexity Minutes (26704) 7   Physical Therapy Goals   PT Frequency 6x/week   PT Predicted Duration/Target Date for Goal Attainment 10/05/24   PT Goals Bed Mobility;Transfers;Gait;Stairs   PT: Bed Mobility Independent;Supine to/from sit;Within precautions   PT: Transfers Independent;Bed to/from chair;Within precautions   PT: Gait Modified independent;Greater than 200 feet  (with LRAD)   PT: Stairs Supervision/stand-by assist;Greater than 10 stairs   PT Discharge Planning   PT " Plan sit<>stands, flat bed mobility, progress gait endurance with chair follow   PT Discharge Recommendation (DC Rec) Transitional Care Facility   PT Rationale for DC Rec Patient well below independent baseline mobility. Currently requiring heavy assist for transfers and ambulation, quite fatigued and weak with mobility, high fall risk. Recommend TCU for continued cares and therapy at this time. Will update DC recs as patient progresses/as appropriate.   PT Brief overview of current status 1-2A FWW pivot transfers currently.

## 2024-09-20 NOTE — PROVIDER NOTIFICATION
"Paged OB/GYN resident at [phone] - 1G - 3043, S.G - \"fyi pt is feeling dizzy and feels as if things are upside down in the room, blood pressure was high 155/120 HR - 77, and O2 - 96 on RA\" thanks Shannan [phone]    Awaiting reply/orders    Zaid Velasquez MD called, suspected delirium, and hydralazine was ordered   "

## 2024-09-20 NOTE — PROGRESS NOTES
"CLINICAL SWALLOW EVALUATION     09/20/24 8932   Appointment Info   Signing Clinician's Name / Credentials (SLP) Yojana Ness [facility]   General Information   Onset of Illness/Injury or Date of Surgery 09/18/24   Referring Physician Leydi Gross APRN CNP   Patient/Family Therapy Goal Statement (SLP) Patient wants to eat/drink foods closer to textures at home.   Pertinent History of Current Problem Per provider note: \"Hortensia Xie is an 84 year old with PMH of partial colectomy, stroke, sinus node dysfunction w/ cardiac pacemaker in place, atrial fibrillation on anti-coagulation, Type 2 diabetes, chronic pain on opioid therapy, and hypertension with newly diagnosed endometrial cancer, here with 4 days of vaginal bleeding with planned TLH, BSO later this morning. She was admitted for vaginal bleeding now HD#3 and POD#1 s/p TLH, BSO, LEIA, vag lac repair, excision of small bowel nodule.\"   General Observations Patient up in chair upon SLP arrival. Note some confusion throughout session. RN reported difficulty chewing scrambled eggs and toast today. RN reported patient took pills whole with water with no coughing. RN reported process was lengthy with multiple sips of water to clear pills. Patient reported usually eating soft foods cut into 1/4 inch size pieces or smooth foods (e.g. mashed potatoes, oatmeal) at home. Patient reported being told after stroke that there is a pocket in her throat to be aware of and careful when eating. No further information available. Patient reported often trying to swallow on the left side.   Type of Evaluation   Type of Evaluation Swallow Evaluation   Oral Motor   Oral Musculature generally intact   Structural Abnormalities none present   Mucosal Quality dry   Dentition (Oral Motor)   Comment, Dentition (Oral Motor) Patient has one implant (lower left side molar). Otherwise edentulous and no dentures.   Dentition (Oral Motor) edentulous   Facial Symmetry (Oral Motor)   Facial " Symmetry (Oral Motor) WNL   Lip Function (Oral Motor)   Lip Range of Motion (Oral Motor) WNL   Lip Strength (Oral Motor) WFL   Tongue Function (Oral Motor)   Tongue Strength (Oral Motor) WFL   Tongue Coordination/Speed (Oral Motor) WNL   Tongue ROM (Oral Motor) WNL   Jaw Function (Oral Motor)   Jaw Function (Oral Motor) WNL   Cough/Swallow/Gag Reflex (Oral Motor)   Soft Palate/Velum (Oral Motor) WNL   Volitional Throat Clear/Cough (Oral Motor) reduced strength   Vocal Quality/Secretion Management (Oral Motor)   Vocal Quality (Oral Motor) WFL   Secretion Management (Oral Motor) WNL   General Swallowing Observations   Past History of Dysphagia None per EMR review. See above for additional patient reported information. Endoscopy 4/15/2007 with mild reflux and slight distal esophageal stricture dilated.   Comment, General Swallowing Observations Patient noted to have piece of gum on tray and patient reported using this due to dry mouth. Patient reported using gum while laying in bed and even sleeping with it in her cheek. Patient agreeable to not using gum and trying Biotene (ordered by provider today).   Respiratory Support room air   Current Diet/Method of Nutritional Intake (General Swallowing Observations, NIS) regular diet;thin liquids (level 0)   Swallowing Evaluation Clinical swallow evaluation   Clinical Swallow Evaluation   Feeding Assistance set up only required   Clinical Swallow Evaluation Textures Trialed thin liquids;pureed;minced & moist   Clinical Swallow Eval: Thin Liquid Texture Trial   Mode of Presentation, Thin Liquids spoon;straw;fed by clinician;self-fed   Volume of Liquid or Food Presented 3 oz water   Oral Phase of Swallow WFL   Pharyngeal Phase of Swallow   (no overt aspiration signs)   Clinical Swallow Evaluation: Puree Solid Texture Trial   Mode of Presentation, Puree spoon;self-fed   Volume of Puree Presented 1 oz   Oral Phase, Puree WFL   Pharyngeal Phase, Puree   (no overt aspiration signs)    Clinical Swallow Eval: Minced & Moist   Mode of Presentation spoon;self-fed   Volume Presented 4 bites   Oral Phase WFL   Pharyngeal Phase   (no overt aspiration signs)   Esophageal Phase of Swallow   Patient reports or presents with symptoms of esophageal dysphagia No   Esophageal comments Patient reported no globus sensation.   Swallowing Recommendations   Diet Consistency Recommendations minced & moist (level 5);thin liquids (level 0)   Supervision Level for Intake distant supervision needed   Mode of Delivery Recommendations bolus size, small;slow rate of intake   Monitoring/Assistance Required (Eating/Swallowing) stop eating activities when fatigue is present;monitor for cough or change in vocal quality with intake   Recommended Feeding/Eating Techniques (Swallow Eval) maintain upright sitting position for eating;minimize distractions during oral intake;set-up and prepare tray   Medication Administration Recommendations, Swallowing (SLP) One at a time   Instrumental Assessment Recommendations instrumental evaluation not recommended at this time   General Therapy Interventions   Planned Therapy Interventions Dysphagia Treatment   Dysphagia treatment Modified diet education;Instruction of safe swallow strategies   Clinical Impression   Criteria for Skilled Therapeutic Interventions Met (SLP Eval) Yes, treatment indicated   SLP Diagnosis Dysphagia   Risks & Benefits of therapy have been explained evaluation/treatment results reviewed;care plan/treatment goals reviewed;risks/benefits reviewed;current/potential barriers reviewed;participants voiced agreement with care plan;participants included;patient   Clinical Impression Comments Mild oropharyngeal dysphagia in setting of generalized weakness and pain following surgery, edentulous state and prior dysphagia. Oral mech exam remarkable for xerostomia and mostly edentulous state. Paitent assessed with thin liquid, puree and minced/moist texture. No overt aspiraiton  signs occurred across intake. Note slow but functional mastication and no oral residue remained. No reported globus sensation. Recommend downgrade diet to minced/moist (IDDSI 5) and continue thin liquids (IDDSI 0) given set up assist and check in supervision and swallow strategies (fully upright position; chair preferred, small single sips/bites, slow pace). Please serve pills one at a time. Recommend biotene for xerostomia and no gum at this time for safety. SLP to follow for short-course.   SLP Total Evaluation Time   Eval: oral/pharyngeal swallow function, clinical swallow Minutes (36126) 12   SLP Goals   Therapy Frequency (SLP Eval) 3 times/week   SLP Predicted Duration/Target Date for Goal Attainment 10/11/24   SLP Goals Swallow       Interventions   Interventions Quick Adds Swallowing Dysfunction   Swallowing Dysfunction &/or Oral Function for Feeding               SLP Discharge Planning       SLP Discharge Recommendation home with assist   SLP Rationale for DC Rec Anticipate patient will meet swallow related goals prior to discharge.   SLP Brief overview of current status  Recommend downgrade diet to minced/moist (IDDSI 5) and continue thin liquids (IDDSI 0) given set up assist and check in supervision and swallow strategies (fully upright position; chair preferred, small single sips/bites, slow pace). Please serve pills one at a time. Recommend biotene for xerostomia and no gum at this time for safety. SLP to follow for short-course.   Total Session Time

## 2024-09-20 NOTE — PLAN OF CARE
"Problem: Adult Inpatient Plan of Care  Goal: Plan of Care Review  Description: The Plan of Care Review/Shift note should be completed every shift.  The Outcome Evaluation is a brief statement about your assessment that the patient is improving, declining, or no change.  This information will be displayed automatically on your shift  note.  Flowsheets (Taken 9/20/2024 5563)  Outcome Evaluation: RNCC spoke with patient's daughter via telephone to start an initial assessment. When patient is more coherent, CM/SW will meet with patient to continue conversation of clinical status and trajectory of care after discharge. Current recommendations for discharge is to TCU, however, family stated the patient would much prefer to go home and continue receiving assistance from her paid \"helpers.\" Unfortunately no family lives nearby that could step in to help at home, but they have a good support system that \"so far works pretty well for her [the patient].\"  Plan of Care Reviewed With:   patient   child  Goal: Readiness for Transition of Care  Recent Flowsheet Documentation  Taken 9/20/2024 1532 by Debora Monreal RN  Transportation Anticipated: family or friend will provide  Concerns to be Addressed: discharge planning  Intervention: Mutually Develop Transition Plan  Recent Flowsheet Documentation  Taken 9/20/2024 1532 by Debora Monreal RN  Transportation Anticipated: family or friend will provide  Transportation Concerns: none  Concerns to be Addressed: discharge planning  Patient/Family Anticipated Services at Transition: none  Patient/Family Anticipates Transition to:   home   home with help/services  Equipment Currently Used at Home: walker, standard     "

## 2024-09-20 NOTE — PROVIDER NOTIFICATION
5201 Hortensia GARCIA Pt experiencing vertigo. States that she feels like the room is upside down, and that she is falling despite laying in bed. Do you have any recommendations or orders to help? Ty mark [phone]

## 2024-09-20 NOTE — PROGRESS NOTES
09/20/24 1700   Appointment Info   Signing Clinician's Name / Credentials (OT) Goyo Monaco OTR/L   Rehab Comments (OT) abdominal precautions.   Living Environment   People in Home alone   Current Living Arrangements house;other (see comments)  ([facility])   Home Accessibility stairs to enter home;stairs within home   Number of Stairs, Main Entrance greater than 10 stairs   Stair Railings, Main Entrance railings safe and in good condition   Number of Stairs, Within Home, Primary greater than 10 stairs   Stair Railings, Within Home, Primary railings safe and in good condition   Transportation Anticipated family or friend will provide   Living Environment Comments Pt reports she has a tub/shower at home.   Self-Care   Usual Activity Tolerance good   Current Activity Tolerance fair   Regular Exercise No   Equipment Currently Used at Home none   Fall history within last six months no   Activity/Exercise/Self-Care Comment Pt reports she is independent at baseline.   General Information   Onset of Illness/Injury or Date of Surgery 09/18/24   Referring Physician Jameel Miller MD   Patient/Family Therapy Goal Statement (OT) Pt would like to get stronger and return home as able.   Additional Occupational Profile Info/Pertinent History of Current Problem Hortensia Xie is an 84 year old with PMH of partial colectomy, stroke, sinus node dysfunction w/ cardiac pacemaker in place, atrial fibrillation on anti-coagulation, Type 2 diabetes, chronic pain on opioid therapy, and hypertension with newly diagnosed endometrial cancer, here with 4 days of vaginal bleeding with planned TLH, BSO later this morning. She was admitted for vaginal bleeding now HD#3 and POD#1 s/p TLH, BSO, LEIA, vag lac repair, excision of small bowel nodule.   Existing Precautions/Restrictions abdominal;fall   Left Upper Extremity (Weight-bearing Status)   (10lbs)   Right Upper Extremity (Weight-bearing Status)   (10lbs)   Left Lower Extremity (Weight-bearing  Status) full weight-bearing (FWB)   Right Lower Extremity (Weight-bearing Status) full weight-bearing (FWB)   Cognitive Status Examination   Orientation Status person;place   Visual Perception   Visual Impairment/Limitations WFL   Sensory   Sensory Quick Adds sensation intact   Pain Assessment   Patient Currently in Pain Yes, see Vital Sign flowsheet   Range of Motion Comprehensive   Comment, General Range of Motion BUE AROM WFL   Strength Comprehensive (MMT)   Comment, General Manual Muscle Testing (MMT) Assessment Pt presents with generalized post op weakness throughout.   Bed Mobility   Comment (Bed Mobility) Declined bed mobility.   Transfers   Transfer Comments Declined OOB mobility.   Activities of Daily Living   BADL Assessment/Intervention bathing;upper body dressing;lower body dressing;grooming;toileting   Bathing Assessment/Intervention   [place] Level (Bathing) set up;verbal cues;moderate assist (50% patient effort)   Comment, (Bathing) Per clinical judgement.   Upper Body Dressing Assessment/Training   Comment, (Upper Body Dressing) Per clinical judgement.   [place] Level (Upper Body Dressing) set up;verbal cues;moderate assist (50% patient effort)   Lower Body Dressing Assessment/Training   Comment, (Lower Body Dressing) Per clinical judgement.   [place] Level (Lower Body Dressing) set up;verbal cues;maximum assist (25% patient effort)   Grooming Assessment/Training   [place] Level (Grooming) set up;verbal cues;minimum assist (75% patient effort)   Comment, (Grooming) Per clinical judgement.   Toileting   Comment, (Toileting) Per clinical judgement.   [place] Level (Toileting) set up;verbal cues;maximum assist (25% patient effort)   Clinical Impression   Criteria for Skilled Therapeutic Interventions Met (OT) Yes, treatment indicated   OT Diagnosis Decreased independence with functional transfers and ADLS/IADLS.   OT Problem List-Impairments impacting ADL problems related  to;activity tolerance impaired;cognition;fear & anxiety;flexibility;mobility;range of motion (ROM);strength;pain;post-surgical precautions;postural control   Assessment of Occupational Performance 5 or more Performance Deficits   Identified Performance Deficits Decreased independence with functional transfers and ADLS/IADLS.   Planned Therapy Interventions (OT) ADL retraining;IADL retraining;bed mobility training;cognition;ROM;strengthening;stretching;visual perception;transfer training;home program guidelines;progressive activity/exercise;risk factor education   Clinical Decision Making Complexity (OT) problem focused assessment/low complexity   Risk & Benefits of therapy have been explained evaluation/treatment results reviewed;care plan/treatment goals reviewed;risks/benefits reviewed;patient   OT Total Evaluation Time   OT Eval, Low Complexity Minutes (70006) 10   OT Goals   Therapy Frequency (OT) 6 times/week   OT Predicted Duration/Target Date for Goal Attainment 09/27/24   OT Discharge Planning   OT Plan OT: Functional transfers, ADLS, Precautions review, Monitor cognition.   OT Discharge Recommendation (DC Rec) Transitional Care Facility   OT Rationale for DC Rec Limited OOB mobility assessment. Pt will benefit from continued therapy in TCU setting prior to returnning home.   Total Session Time   Total Session Time (sum of timed and untimed services) 10

## 2024-09-20 NOTE — PLAN OF CARE
[id number]    Activity:   assist of 1 and assist of 2  LDA's:  Peripheral  Vitals/Pain/Neuro: A&Ox4, stated vision was flipped upside down this morning, but has since gotten better, Denies nausea, pain in mouth/throat (PRN oxy x2)  VSS on RA  GI/: Voids spontaneously without difficulty (commode)  Diet: Regular - Was having difficulties, swallow eval ordered  Skin: 5 lap sites, heel/ankle abrasions  Other Cares/Comments:  Was able to get up into the chair with PT today      Goal Outcome Evaluation:      Plan of Care Reviewed With: patient    Overall Patient Progress: no changeOverall Patient Progress: no change    Outcome Evaluation: POD#1

## 2024-09-20 NOTE — PROVIDER NOTIFICATION
Alerted provider Leydi Gross @ 1300 for pt stating that she is having a harder time eating today than her baseline. She noted that before covid there was an accident where she lost her teeth and does not wear dentures ever. Alerted provider asking for swallow eval with possible change of diet plan.     Provider put in orders for a swallow eval, and we will follow new diet orders if needed.     Pt currently sitting upright in chair trying to eat slowly and seemed to be onboard with the plan.

## 2024-09-20 NOTE — PROVIDER NOTIFICATION
4K- 0718, SG - i had a question regarding her 2000 Tylenol 975 and 2100 Tylenol 650, do i give her both? if not can you make some changes to the orders. thanks, Shannan [phone]    Awaiting reply/orders     Tylenol 650 was discontinued

## 2024-09-20 NOTE — PLAN OF CARE
"Goal Outcome Evaluation:      Plan of Care Reviewed With: patient    Overall Patient Progress: improvingOverall Patient Progress: improving    BP (!) 131/93 (BP Location: Right arm)   Pulse 72   Temp 98.3  F (36.8  C) (Oral)   Resp 16   Ht 1.676 m (5' 6\")   Wt 80.2 kg (176 lb 14.4 oz)   SpO2 96%   BMI 28.55 kg/m      8734-1719    A&O x 4, calls appropriately  Afebrile, OVSS on RA, on capno, on pulse ox  C/o abdominal/perineum/vaginal pain, rating pain at 8/10, pain was managed w/ PRN oxy x 1, c/o spells of dizziness that resolved at first and then c/o persistent dizziness, MD was notified and pt was assessed at bedside (suspect combination of delirium and side effect from recent oxycodone administration (per MD)), narcan was also given to counteract the high dose of oxycodone, BG checked and it was 118  Denies n/v, increased SOB and chest pain  On regular diet   x5 lap sites w/ liquid bandage are CDI  Voiding adequately w/o difficulty and w/ good UOP  Passing gas, no BM during shift   Ax1 when out of bed and when using the commode  x2 PIV, both Sl'd    "

## 2024-09-20 NOTE — PROVIDER NOTIFICATION
"5A - 5201 - S.G - pt is experiencing dizziness again, can you please come and assess her, thanks\" Shannan [phone]    Awaiting reply    Pt was assessed by Zaid Velasquez MD at bedside  "

## 2024-09-20 NOTE — PROVIDER NOTIFICATION
"Paged OB/GYN resident at [phone] - [phone], SG - \"pt's good friend is in the room and she would like to speak to someone about exactly what procedure the pt had, and OT called me saying he thinks she a bit disoriented\" Shannan [phone]    ** Assessed at bedside by MD   "

## 2024-09-20 NOTE — PROGRESS NOTES
Brief Progress Note    Notified by bedside RN of brief spell of dizziness that spontaneously resolved earlier this evening. Notified again that patient now feeling persistently dizzy. In room to assess.    Patient denies pain, chest pain, SOB, fevers/chills, nausea/vomiting. Tolerated water and hungry. States she woke up to a phone call from her 's wife and was disoriented and didn't know where she was. Felt like certain objects in the room were upside down. States this has happened to her in the past when she got too much oxycodone.     Temp: 99.1  F (37.3  C) Temp src: Oral BP: (!) 142/61 Pulse: 76   Resp: 16 SpO2: 96 % O2 Device: None (Room air)    Gen: NAD, resting comfortably in bed  CV: RRR  Pulm: CTAB, breathing comfortably on room air with normal RR  Abd: Mildly distended, nontender, soft  Incisions: C/D/I with overlying surgical glue    Bedside B    Suspect combination of delirium and side effect from recent oxycodone administration. Discussed options to continue to monitor vs try a dose of Narcan to counteract the higher dose of oxycodone. Patient prefers to try a dose of Narcan. Symptoms seem to improve some and patient desires to use the commode. Will limit the amount of narcotic used and continue delirium/fall precautions. Oxycodone dose reduced to 2.5 or 5 mg rather than 10 mg as previously administered. Plan to offer non-narcotic pain medications first and lowest dose if necessary for pain control overnight.    Zaid Velasquez MD, MPH  Ob/Gyn Resident, PGY-4  24 10:25 PM

## 2024-09-20 NOTE — PLAN OF CARE
Shift - NOC 8140-4745  Reason for admission - Hysterectomy; POD#2   VS - Stable on RA; on continuous pulse ox and capno BG - ACHS   Orientation - A&Ox4 intermittent episode of disorientation this AM; experiencing intense vertigo; states that it feels like she's falling and will make remarks about how the nursing staff look like they are standing horizontally.  Activity - Not out of bed this shift; bed alarm on  Lines & Drains - PIV SL  GI/ - Voiding spontaneously   Skin & wounds - 5X lap sites DASHA with liquid bandage   Updates or issues this shift - Provider notified for vertigo episodes; medications ordered and morning labs moved to STAT   Plan - Continue with reorientation of things in the room; Continue with pain control and monitoring of vaginal bleeding.       Goal Outcome Evaluation:           Overall Patient Progress: no changeOverall Patient Progress: no change

## 2024-09-20 NOTE — PROVIDER NOTIFICATION
"Paged Gyn Onc Resident via [employer] @ [phone]: FYI: New increased agitation. Trying to get up and stating \"she needs to leave\" and \"get out of here\". Are you able to come to bedside? Benita COTTO (on behalf of Alise KIMBROUGH) *[phone]    Response:   "

## 2024-09-20 NOTE — PROGRESS NOTES
"CLINICAL NUTRITION SERVICES - ASSESSMENT NOTE     Nutrition Prescription    RECOMMENDATIONS FOR MDs/PROVIDERS TO ORDER:  Diet per SLP recs/provider discretion given lack of teeth    Recommendations already ordered by Registered Dietitian (RD):  PRN supplements    Future/Additional Recommendations:  Encourage patient to consume at least 75% of meals TID or the equivalent with snacks/supplements.  If consuming less than this offer oral nutrition supplements, scheduled snacks, and/or consider ordering calorie counts to assess PO intake adequacy.        REASON FOR ASSESSMENT  Hortensia Xie is a/an 84 year old female assessed by the dietitian for Admission Nutrition Risk Screen for positive (\"no\" decreased appetite/PO intakes, \"unsure\" weight loss)    NUTRITION HISTORY  SLP eval today (per RN note, pt reports she has no teeth and has never worn dentures)    Per provider note today, \"PMH of partial colectomy, stroke, sinus node dysfunction w/ cardiac pacemaker in place, atrial fibrillation on anti-coagulation, Type 2 diabetes, chronic pain on opioid therapy, and hypertension with newly diagnosed endometrial cancer, here with 4 days of vaginal bleeding with planned TLH, BSO later this morning. She was admitted for vaginal bleeding now HD#3 and POD#1 s/p TLH, BSO, LEIA, vag lac repair, excision of small bowel nodule. \"    Per WOC note today, pt with left lateral ankle and heel wounds due to unknown etiology.    CURRENT NUTRITION ORDERS  Diet: Regular    Intake/Tolerance: per RN note this afternoon pt reports having a harder time eating than her baseline (pt has no teeth and does not wear dentures)    LABS  Labs reviewed    MEDICATIONS  Medications reviewed    ANTHROPOMETRICS  Height: 167.6 cm (5' 6\")  Most Recent Weight: 80.2 kg (176 lb 14.4 oz)    IBW: 59.1 kg   BMI: Overweight BMI 25-29.9   Weight History: 18 lb wt loss x 8 months (9%); 7 lb of this in the past 3 months (3.8%)  Wt Readings from Last 10 Encounters: "   09/19/24 80.2 kg (176 lb 14.4 oz)      Additional weight hx per Care Everywhere:  09/13/2024  82.1 kg (181 lb 1.6 oz)   06/08/2024  83.1 kg (183 lb 4.8 oz)   01/03/2024  88.3 kg (194 lb 11.2 oz)     Dosing Weight: 65 kg (adjusted based on actual wt 80.2 kg and IBW)    ASSESSED NUTRITION NEEDS  Estimated Energy Needs: 7434-0278 kcals/day (25 - 30 kcals/kg)  Justification: Maintenance  Estimated Protein Needs: 78-98 grams protein/day (1.2 - 1.5 grams of pro/kg)  Justification: Post-op  Estimated Fluid Needs: (1 mL/kcal)   Justification: Maintenance, or other per provider pending fluid status    PHYSICAL FINDINGS  See malnutrition section below.    MALNUTRITION  % Intake: Unable to assess  % Weight Loss: Weight loss does not meet criteria  Subcutaneous Fat Loss: Unable to assess  Muscle Loss: Unable to assess  Fluid Accumulation/Edema: None noted per provider note today  Malnutrition Diagnosis: Unable to determine due to pt with other cares during attempts to visit    NUTRITION DIAGNOSIS  Inadequate oral intake related to eating/chewing difficulties as evidenced by reported chewing difficulties due to no teeth per RN note today      INTERVENTIONS  Implementation  Nutrition Education: Unable to complete due to pt with other cares during attempts to visit   Medical food supplement therapy     Goals  Patient to consume % of nutritionally adequate meal trays TID, or the equivalent with supplements/snacks.     Monitoring/Evaluation  Progress toward goals will be monitored and evaluated per protocol.     Corin Osorio RD, , LD  Weekday Units covered: 5A (non-Heme Onc pts) and 7B (4970-4769)  Available by 5A or 7B Cesar Brown  Weekend Coverage: Weekend Clinical Dietdemond Brown    Clinical Dietitians no longer available via paging

## 2024-09-20 NOTE — PROGRESS NOTES
CMA attempted at 1030, patient appearing confused and emotionally labile. Wound care RN present and finishing wound cares at time of contact. Patient stated she was in a great deal of pain and preferred to speak at a later time. RNCC verified that patient's daughter, Sonia, would be an appropriate point of contact to assess and update basic information.    RNCC attempted to call pt's daughter at 1052, will attempt again later.

## 2024-09-20 NOTE — PROGRESS NOTES
"Brief Gyn Onc Progress Note    To bedside to check in with Hortensia. Her friend is visiting at bedside. Hortensia is feeling tired, but ok. She says she is voiding without difficulty, she endorses chronic mild incontinence at baseline. Her pain is currently well controlled. She denies nausea, vomiting, lightheadedness, dizziness, chest pain, shortness of breath. She and her friend have questions about what surgery she had exactly, so this was reviewed in detail, and they expressed understanding.     /73 (BP Location: Right arm)   Pulse 89   Temp 98.4  F (36.9  C) (Axillary)   Resp 20   Ht 1.676 m (5' 6\")   Wt 80.2 kg (176 lb 14.4 oz)   SpO2 95%   BMI 28.55 kg/m    Gen: resting comfortably, sitting up in bed eating dinner  Resp: normal work of breathing on room air  CV: appears slightly pale, overall well perfused  Abd: soft, non-tender    Discussed goal of tracking urine output, Hortensia said she will try to let the nurse know when she voids.     - q4h I&O  - restart warfarin now (ordered for 1800)  - please see Speech Language Pathology note from today for dysphagia recommendations (orders placed for minced/moist diet, thin liquids). Serve pills one at a time. Biotene for xerostomia, no gum. SLP following.  - plan discharge to TCU pending TCU placement/availability    Cristal Zhu MD  Obstetrics & Gynecology, PGY-3  09/20/2024 6:28 PM    "
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
## 2024-09-21 LAB
ANION GAP SERPL CALCULATED.3IONS-SCNC: 11 MMOL/L (ref 7–15)
BUN SERPL-MCNC: 16.1 MG/DL (ref 8–23)
CALCIUM SERPL-MCNC: 8.7 MG/DL (ref 8.8–10.4)
CHLORIDE SERPL-SCNC: 99 MMOL/L (ref 98–107)
CREAT SERPL-MCNC: 0.82 MG/DL (ref 0.51–0.95)
EGFRCR SERPLBLD CKD-EPI 2021: 71 ML/MIN/1.73M2
ERYTHROCYTE [DISTWIDTH] IN BLOOD BY AUTOMATED COUNT: 15 % (ref 10–15)
GLUCOSE BLDC GLUCOMTR-MCNC: 108 MG/DL (ref 70–99)
GLUCOSE BLDC GLUCOMTR-MCNC: 111 MG/DL (ref 70–99)
GLUCOSE BLDC GLUCOMTR-MCNC: 115 MG/DL (ref 70–99)
GLUCOSE SERPL-MCNC: 116 MG/DL (ref 70–99)
HCO3 SERPL-SCNC: 24 MMOL/L (ref 22–29)
HCT VFR BLD AUTO: 29.8 % (ref 35–47)
HGB BLD-MCNC: 9.5 G/DL (ref 11.7–15.7)
INR PPP: 1.48 (ref 0.85–1.15)
MCH RBC QN AUTO: 27.9 PG (ref 26.5–33)
MCHC RBC AUTO-ENTMCNC: 31.9 G/DL (ref 31.5–36.5)
MCV RBC AUTO: 88 FL (ref 78–100)
PLATELET # BLD AUTO: 228 10E3/UL (ref 150–450)
POTASSIUM SERPL-SCNC: 4.2 MMOL/L (ref 3.4–5.3)
RBC # BLD AUTO: 3.4 10E6/UL (ref 3.8–5.2)
SODIUM SERPL-SCNC: 134 MMOL/L (ref 135–145)
WBC # BLD AUTO: 10.4 10E3/UL (ref 4–11)

## 2024-09-21 PROCEDURE — 250N000013 HC RX MED GY IP 250 OP 250 PS 637: Performed by: OBSTETRICS & GYNECOLOGY

## 2024-09-21 PROCEDURE — 36415 COLL VENOUS BLD VENIPUNCTURE: CPT

## 2024-09-21 PROCEDURE — 250N000013 HC RX MED GY IP 250 OP 250 PS 637

## 2024-09-21 PROCEDURE — 85027 COMPLETE CBC AUTOMATED: CPT

## 2024-09-21 PROCEDURE — 85610 PROTHROMBIN TIME: CPT

## 2024-09-21 PROCEDURE — 120N000002 HC R&B MED SURG/OB UMMC

## 2024-09-21 PROCEDURE — 82310 ASSAY OF CALCIUM: CPT

## 2024-09-21 PROCEDURE — 80048 BASIC METABOLIC PNL TOTAL CA: CPT

## 2024-09-21 RX ORDER — AMOXICILLIN 250 MG
2 CAPSULE ORAL 2 TIMES DAILY
Status: DISCONTINUED | OUTPATIENT
Start: 2024-09-21 | End: 2024-09-24 | Stop reason: HOSPADM

## 2024-09-21 RX ORDER — AMOXICILLIN 250 MG
1 CAPSULE ORAL 2 TIMES DAILY
Status: DISCONTINUED | OUTPATIENT
Start: 2024-09-21 | End: 2024-09-23

## 2024-09-21 RX ORDER — WARFARIN SODIUM 2 MG/1
2 TABLET ORAL
Status: COMPLETED | OUTPATIENT
Start: 2024-09-21 | End: 2024-09-21

## 2024-09-21 RX ADMIN — MECLIZINE HYDROCHLORIDE 12.5 MG: 12.5 TABLET ORAL at 09:14

## 2024-09-21 RX ADMIN — ATENOLOL 50 MG: 50 TABLET ORAL at 20:32

## 2024-09-21 RX ADMIN — HYDRALAZINE HYDROCHLORIDE 50 MG: 50 TABLET ORAL at 09:14

## 2024-09-21 RX ADMIN — FAMOTIDINE 20 MG: 20 TABLET ORAL at 09:14

## 2024-09-21 RX ADMIN — ACETAMINOPHEN 975 MG: 325 TABLET ORAL at 09:14

## 2024-09-21 RX ADMIN — DISOPYRAMIDE PHOSPHATE 100 MG: 100 CAPSULE, GELATIN COATED ORAL at 01:22

## 2024-09-21 RX ADMIN — OXYCODONE HYDROCHLORIDE 2.5 MG: 5 TABLET ORAL at 10:35

## 2024-09-21 RX ADMIN — ATENOLOL 50 MG: 50 TABLET ORAL at 09:14

## 2024-09-21 RX ADMIN — ACETAMINOPHEN 975 MG: 325 TABLET ORAL at 20:22

## 2024-09-21 RX ADMIN — PREGABALIN 100 MG: 50 CAPSULE ORAL at 09:14

## 2024-09-21 RX ADMIN — Medication 1 LOZENGE: at 09:14

## 2024-09-21 RX ADMIN — ACETAMINOPHEN 975 MG: 325 TABLET ORAL at 01:22

## 2024-09-21 RX ADMIN — ACETAMINOPHEN 975 MG: 325 TABLET ORAL at 14:27

## 2024-09-21 RX ADMIN — PREGABALIN 100 MG: 50 CAPSULE ORAL at 20:22

## 2024-09-21 RX ADMIN — HYDRALAZINE HYDROCHLORIDE 50 MG: 50 TABLET ORAL at 20:32

## 2024-09-21 RX ADMIN — OXYCODONE HYDROCHLORIDE 2.5 MG: 5 TABLET ORAL at 06:20

## 2024-09-21 RX ADMIN — FAMOTIDINE 20 MG: 20 TABLET ORAL at 20:22

## 2024-09-21 RX ADMIN — SENNOSIDES AND DOCUSATE SODIUM 2 TABLET: 8.6; 5 TABLET ORAL at 20:22

## 2024-09-21 RX ADMIN — DISOPYRAMIDE PHOSPHATE 100 MG: 100 CAPSULE, GELATIN COATED ORAL at 09:14

## 2024-09-21 RX ADMIN — LEVOTHYROXINE SODIUM 75 MCG: 75 TABLET ORAL at 09:14

## 2024-09-21 RX ADMIN — DISOPYRAMIDE PHOSPHATE 100 MG: 100 CAPSULE, GELATIN COATED ORAL at 17:20

## 2024-09-21 RX ADMIN — WARFARIN SODIUM 2 MG: 2 TABLET ORAL at 18:31

## 2024-09-21 ASSESSMENT — ACTIVITIES OF DAILY LIVING (ADL)
ADLS_ACUITY_SCORE: 34
ADLS_ACUITY_SCORE: 34
ADLS_ACUITY_SCORE: 30
ADLS_ACUITY_SCORE: 27
ADLS_ACUITY_SCORE: 30
ADLS_ACUITY_SCORE: 34
ADLS_ACUITY_SCORE: 34
ADLS_ACUITY_SCORE: 30
ADLS_ACUITY_SCORE: 27
ADLS_ACUITY_SCORE: 30
ADLS_ACUITY_SCORE: 34
ADLS_ACUITY_SCORE: 30
ADLS_ACUITY_SCORE: 34
ADLS_ACUITY_SCORE: 30
ADLS_ACUITY_SCORE: 30
ADLS_ACUITY_SCORE: 34
ADLS_ACUITY_SCORE: 30

## 2024-09-21 NOTE — PLAN OF CARE
"Goal Outcome Evaluation:    Plan of Care Reviewed With: patient    Overall Patient Progress: no changeOverall Patient Progress: no change    BP (!) 164/66 (BP Location: Left arm)   Pulse 70   Temp 98.3  F (36.8  C) (Oral)   Resp 16   Ht 1.676 m (5' 6\")   Wt 80.6 kg (177 lb 9.6 oz)   SpO2 97%   BMI 28.67 kg/m        8461-2987     POD#2     A&O x 4, a little off at times but calls appropriately, bed alarm on for safety   Afebrile, hypertensive but OVSS on RA  C/o abdominal/perineum/vaginal pain, rating it at 3/10, pain was managed scheduled tylenol  Denies n/v, dizziness, increased SOB and chest pain  Tolerating minced/moist & thin liquid diet, q4h I&O   BG checks = 111, and 108  x5 lap sites DASHA w/ liquid bandage, and heel abrasions covered w/ primapore   Voiding spontaneously and w/o difficulty using bedside commode, purewick is in place for overnight   Passing gas, no BM during shift, senna given   Ax1-2 when out of bed and when using the commode  x2 PIV, both Sl'd            "

## 2024-09-21 NOTE — PROGRESS NOTES
Brief progress note    Patient doing well, reports some dizziness and vertigo. Meclizine helped but vertigo returned. Was better when sitting in chair. Patient reports no nausea/vomiting, passing flatus, no BM. Pain controlled while stationary, increases with ambulation/moving. A/Ox3, abdomen soft, appropriately tender. Will put in delirium precautions, encouraged patient to ambulate, eat lunch.    Baldev Ventura MD  Gynecologic-Oncology, PGY-2   09/21/2024 12:04 PM    Gyn-Onc pager: [phone]

## 2024-09-21 NOTE — PROGRESS NOTES
"Care Management Follow Up    Length of Stay (days): 3    Expected Discharge Date: 09/23/2024     Concerns to be Addressed: discharge planning     Patient plan of care discussed at interdisciplinary rounds: Yes    Anticipated Discharge Disposition:  TCU     Anticipated Discharge Services:  n/a  Anticipated Discharge DME:  n/a    Patient/family educated on Medicare website which has current facility and service quality ratings:  yes  Education Provided on the Discharge Plan:  yes  Patient/Family in Agreement with the Plan:  yes    Referrals Placed by CM/SW:  see below  Private pay costs discussed: Not applicable    Discussed  Partnership in Safe Discharge Planning  document with patient/family: No     Handoff Completed: no    Additional Information:  Referrals sent via Destinations in [facility]  Destination    Service Provider Request Status Selected Services Address Phone Fax Patient Preferred   [facility] ([facility])  Pending - Request Sent N/A [address] [place] [address] [phone] --   Current Capacity last updated by Crystal Jolly on 9/18/2024 10:27 AM    09/13/24: Per admissions, no weekend admissions and does not accept medicaid patients. , 09/17/24: Per admissions \"No beds this week\". [facility] ([facility])  Pending - Request Sent N/A [address] [facility] [address] [phone] --   Current Capacity last updated by Kalyn Fall MA on 8/30/2024  2:42 PM    8/30: No LTC bed available for a few weeks            [facility] ([facility])  Pending - Request Sent N/A [address] [facility] [address] [phone] --       Has Epic access - No [employer]            [facility] (SNF,skilled nursing)  Pending - Request Sent N/A [address] [address] [phone] --   Current Capacity last updated by Jameel Sloan MA on 9/20/2024  9:37 AM    9/20 per Maria M they are 2 to 3 weeks " out on open TCU beds ~ap            [facility] ([facility])  Pending - Request Sent N/A [address] [place] [id number] [phone] --   [facility] ([facility])  Declined  Bed Not Available N/A [address], [place] [id number] [phone] --     Gyn/Onc provider updated SW that pt is medically ready for discharge and is agreeable with TCU placement.     SW met with pt and she was okay with SW sending referrals to TCU's that are close to [place]. Pt had mentioned in CMA that she had gone to [facility]. SW looked it up and it is closed. Pt said she thought it was too.   SW sent referrals and called pt's daughter leaving a message to call her back about referrals.     Next Steps: Follow up on referrals. Send more referrals Monday afternoon if not accepted by the ones above.     Social work will continue to follow and provide assistance to ensure a safe and timely discharge   GREGG Roca 5A/C  9/22/2024       Social Work and Care Management Department       SEARCHABLE in [facility] - search SOCIAL WORK       [place] (0800 - 1630) Saturday and Sunday     Units: 4A Vocera, 4C Vocera, & 4E Vocera        Units: 5A 4692-9713 Vocera, 5A 9317-7317 Vocera , BMT SW 1 BMT SW 2, BMT SW 3 & BMT SW 4  5C Off Service 5401 - 5416  5C Off Service 8303-7030     Units: 6A Vocera & 6B Vocera      Units: 6C Vocera     Units: 7A Vocera & 7B Vocera      Units: 7C Med Surg 7401 thru 7418 and 7C Med Surg 7502 thru 7521      Unit: [place] ED Vocera & [place] Obs Vocera     [facility] ([phone]) Saturday and Sunday      Units: 5 Ortho Vocera, 5 Med Surg Vocera & WB ED Vocera     Units: 6 Med Surg Vocera, 8 Med Surg Vocera, & 10 ICU Vocera      After hours Vocera [facility] and After Hours Vocera [place]     Saturday & Sunday (1630 - 0000)    Mon-Fri (8544-1014)     [facility] Recognized Holidays  (9925-4975)    Units: ALL   - see above VOCERA links to  units and after hours

## 2024-09-21 NOTE — PROGRESS NOTES
"Gynecology Oncology Progress Note  September 21, 2024    24H:  - SSI held  - Voided  - Restarted hydralazine, warfarin  - S/p SLP, OT, WOC    Subjective:   Doing ok this morning, oriented to self, place and time. She has vertigo again, says she feels like she's upside down. Pain \"is there,\" rates 8/10, would like some pain medication. Denies fevers, chills, chest pain, SOB. Voiding without issue, just had an episode of urinary incontinence in diaper. Unsure if she has passed gas this morning, did yesterday. Tolerating regular diet with small bites and soft foods, without nausea/vomiting.    Objective:   BP (!) 163/64 (BP Location: Right arm)   Pulse 70   Temp 97.9  F (36.6  C) (Oral)   Resp 16   Ht 1.676 m (5' 6\")   Wt 80.2 kg (176 lb 14.4 oz)   SpO2 100%   BMI 28.55 kg/m    General: sleeping on entry to room, easily aroused  CV: RRR, well perfused  Resp: No increased work of breathing on room air, breath sounds normal  Abdomen: soft, nontender, mildly distended  Incisions: Laparoscopic incisions with skin well approximated and overlying dermabond C/D/I without adjacent erythema/edema  Extremities: nontender, no edema, left heel with bandage    I/Os  (Yesterday // Since Midnight)  PO: 330 mL // 330 mL  IV: 0 // 0  Urine: 600 mL + 1 unmeasured // 3 unmeasured    New labs/imaging-  AM CBC, BMP, INR ordered, to be collected    Assessment/Plan: Hortensia Xie is an 84 year old with PMH of partial colectomy, stroke, sinus node dysfunction w/ cardiac pacemaker in place, atrial fibrillation on anti-coagulation, Type 2 diabetes, chronic pain on opioid therapy, and hypertension with newly diagnosed endometrial cancer, here with 4 days of vaginal bleeding with planned TLH, BSO later this morning. She was admitted for vaginal bleeding now HD#4 and POD#2 s/p TLH, BSO, LEIA, vag lac repair, excision of small bowel nodule.      # Vaginal bleeding  # Endometrial cancer  # Postoperative state  FEN: S/p SLP for dysphagia and " swallow eval. Minced/moist diet, thin liquids per SLP.  Pain: Scheduled tylenol and PTA lyrica, PRN oxy   Heme: Hgb improved prior to surgery and surgery with minimal blood loss. Hgb stable, AM CBC ordered. Monitor VS.  GI: Scheduled bowel regimen. PRN antiemetics.  : S/p solis, baseline mild incontinence. Voiding w/o issues.    # Right heel wound  Appreciate WOC recommendations. Daily RN care as follows (ordered):  Cleanse wounds with MicroKlenz wound cleanser and pat dry.  Apply nickel thick amount of Medihoney (#[id number]) on areas that will cover the wound bed to a Primapore dressing.  Apply to wound.   Keep heels lifted off bed.    # Intermittent vertigo  Meclizine prn     # Type 2 Diabetes, not on medication  - Patient has previously declined medication  - Sliding scale insulin held given mostly normal sugars, POCT glucose 4x daily while in house  - Hypoglycemia precautions     # HTN  - PTA atenolol continued in house  - PTA hydralazine restarted HD#2 due to elevated blood pressures     # Atrial fibrillation  # Pacemaker in situ  - Pharmacy consulted, PTA warfarin resumed HD#3  - PTA disopyramide continued in house     # History of stroke  # Chronic pain 2/2 stroke  - PTA Lyrica continued in house  - Mary Tylenol, PRN oxycodone for pain - patient takes Percocet at home.  - Fall, delirium precautions in place     # Hypothyroidism  - PTA levothyroxine continued in house    Disposition: Dispo pending postoperative goals and TCU placement per PT/OT recommendations.    Cristal Zhu MD  Obstetrics & Gynecology, PGY-3  09/21/2024 6:40 AM    Pager [phone]

## 2024-09-21 NOTE — PROGRESS NOTES
"Gynecology Oncology Progress Note  September 22, 2024    24H:  - vertigo/dizziness  - straining to void  - delirium precautions    Subjective:   Doing well this morning, oriented to self, place and time. No vertigo. Patient reports feeling mildly disoriented but is able to reorient herself when she has the door open. Her pain is at her baseline of 7-8/10. Denies fevers, chills, chest pain, SOB. Voiding with some straining for the last amount, but otherwise voiding easily. Passing flatus, no BM. Tolerating minced/diced diet without nausea/vomiting.    Objective:   BP (!) 174/72 (BP Location: Left arm)   Pulse 77   Temp 98.5  F (36.9  C) (Oral)   Resp 16   Ht 1.676 m (5' 6\")   Wt 80.6 kg (177 lb 9.6 oz)   SpO2 97%   BMI 28.67 kg/m    General: NAD  CV: RRR, well perfused  Resp: No increased work of breathing on room air  Abdomen: soft, appropriately tender, mildly distended  Incisions: Laparoscopic incisions with skin well approximated and overlying dermabond C/D/I without adjacent erythema/edema  Extremities: nontender, no edema, left heel with bandage    I/Os  (Yesterday // Since Midnight)  PO: 450 mL // 80mL  IV: 0 // 0  Urine: 2900//1675    2.96 ml/kg/hr    New labs/imaging-  INR per pharmacy    Assessment/Plan: Hortensia Xie is an 84 year old with PMH of partial colectomy, stroke, sinus node dysfunction w/ cardiac pacemaker in place, atrial fibrillation on anti-coagulation, Type 2 diabetes, chronic pain on opioid therapy, and hypertension with newly diagnosed endometrial cancer, here with 4 days of vaginal bleeding with planned TLH, BSO later this morning. She was admitted for vaginal bleeding now HD#5 and POD#3 s/p TLH, BSO, LEIA, vag lac repair, excision of small bowel nodule.      # Vaginal bleeding  # Endometrial cancer  # Postoperative state  FEN: Minced/moist diet, thin liquids per SLP. Tolerating well w/ no n/v  Pain: Scheduled tylenol and PTA lyrica, PRN oxy   Heme: Hgb improved prior to surgery " and surgery with minimal blood loss. Hgb stable, VSS.  GI: Scheduled bowel regimen. PRN antiemetics.  : S/p solis, some straining with urination, improving with appropriate PVR.    # Right heel wound  Appreciate WOC recommendations. Daily RN care as follows (ordered):  Cleanse wounds with MicroKlenz wound cleanser and pat dry.  Apply nickel thick amount of Medihoney (#[id number]) on areas that will cover the wound bed to a Primapore dressing.  Apply to wound.   Keep heels lifted off bed.    # Intermittent vertigo  Meclizine prn, limit to one dose/day. Hold given vertigo more likely manifestation of delirium and not      # Type 2 Diabetes, not on medication  - Patient has previously declined medication  - Sliding scale insulin held given mostly normal sugars, POCT glucose 4x daily while in house  - Hypoglycemia precautions     # HTN  - PTA atenolol continued in house  - PTA hydralazine restarted HD#2 due to elevated blood pressures     # Atrial fibrillation  # Pacemaker in situ  - Pharmacy consulted, PTA warfarin resumed HD#3, managed by pharm  - PTA disopyramide continued in house     # History of stroke  # Chronic pain 2/2 stroke  - PTA Lyrica continued in house  - Mary Tylenol, PRN oxycodone for pain - patient takes Percocet at home.  - Fall, delirium precautions in place     # Hypothyroidism  - PTA levothyroxine continued in house    Disposition: Patient meeting postoperative goals, pending TCU placement per PT/OT recs.     Baldev Ventura MD  Gynecologic-Oncology, PGY-2   09/22/2024 7:30 AM    Gyn-Onc pager: [phone]

## 2024-09-21 NOTE — PROVIDER NOTIFICATION
"Txted Baldev Ventura MD via [employer] at 1726 -     \"I just wanted to let you know that pt voided 200 mL, we did a bladder scan which showed 279 PVR, and then pt attempted to void again and voided 200 mL but was straining to urinate\"    Reply 1729 - so she wasn't straining the first void?\"    RN reply 1729 - \"no she was not\"    Reply 1729 - \"got it, thanks for the update!\"      "

## 2024-09-21 NOTE — PROGRESS NOTES
Brief progress note    At bedside to assess patient's status. She is sitting upright in her chair eating lunch after having walked around the floor. Passing flatus, no BM yet. Some burning with urination from the vaginal lacerations. Patient reports some difficulty and straining with urination that is new after the surgery and is slowly improving; however she is still able to urinate. Will get a PVR after her next urination to assess whether she is able to empty her bladder completely.     Baldev Ventura MD  Gynecologic-Oncology, PGY-2   09/21/2024 4:08 PM    Gyn-Onc pager: [phone]

## 2024-09-21 NOTE — PLAN OF CARE
[id number]    Activity:   assist of 2  LDA's:  Peripheral  Vitals/Pain/Neuro: A&Ox4, denies nausea, bouts of vertigo today that seemed to improve sitting in the chair. Gave meclizine x1, didn't seem to help much. Pain more this morning than afternoon, scheduled tylenol and PRN oxy given.   GI/: Voids spontaneously and able to call for help to use commode at bedside. No BM this shift. Pt states that it sometimes hurts to urinate due to the surgery and also noted that she seems to be straining to urinate at times with her face turning bright red. (Provider was notified, and stated that pt had vaginal lacerations with the surgery and PVR ordered.)  Diet: Minced and moist while sitting upright in high back chair. Seems to be tolerating this diet well and was able to eat most of her tray.   Skin: L heel and ankle has 3 sores. Cleaned and placed dressings on per wound care orders.   Labs/imaging: PVR will need to happen next time pt voids  Other Cares/Comments: Was able to take some time this morning to get the mat out of her hair and wash her hair and give a haircut and just talk to her for awhile. Pt seemed to be in less pain and able to hold a decent conversation when in room.       Goal Outcome Evaluation:      Plan of Care Reviewed With: patient    Overall Patient Progress: no changeOverall Patient Progress: no change    Outcome Evaluation: POD #2

## 2024-09-22 LAB
GLUCOSE BLDC GLUCOMTR-MCNC: 106 MG/DL (ref 70–99)
GLUCOSE BLDC GLUCOMTR-MCNC: 118 MG/DL (ref 70–99)
GLUCOSE BLDC GLUCOMTR-MCNC: 119 MG/DL (ref 70–99)
GLUCOSE BLDC GLUCOMTR-MCNC: 97 MG/DL (ref 70–99)
INR PPP: 1.53 (ref 0.85–1.15)

## 2024-09-22 PROCEDURE — 250N000013 HC RX MED GY IP 250 OP 250 PS 637: Performed by: OBSTETRICS & GYNECOLOGY

## 2024-09-22 PROCEDURE — 250N000013 HC RX MED GY IP 250 OP 250 PS 637

## 2024-09-22 PROCEDURE — 120N000002 HC R&B MED SURG/OB UMMC

## 2024-09-22 PROCEDURE — 36415 COLL VENOUS BLD VENIPUNCTURE: CPT

## 2024-09-22 PROCEDURE — 85610 PROTHROMBIN TIME: CPT

## 2024-09-22 RX ORDER — WARFARIN SODIUM 2 MG/1
2 TABLET ORAL
Status: COMPLETED | OUTPATIENT
Start: 2024-09-22 | End: 2024-09-22

## 2024-09-22 RX ORDER — FAMOTIDINE 20 MG/1
20 TABLET, FILM COATED ORAL DAILY
Status: DISCONTINUED | OUTPATIENT
Start: 2024-09-23 | End: 2024-09-24 | Stop reason: HOSPADM

## 2024-09-22 RX ADMIN — SENNOSIDES AND DOCUSATE SODIUM 2 TABLET: 8.6; 5 TABLET ORAL at 08:44

## 2024-09-22 RX ADMIN — HYDRALAZINE HYDROCHLORIDE 50 MG: 50 TABLET ORAL at 19:49

## 2024-09-22 RX ADMIN — PREGABALIN 100 MG: 50 CAPSULE ORAL at 19:49

## 2024-09-22 RX ADMIN — ACETAMINOPHEN 975 MG: 325 TABLET ORAL at 08:45

## 2024-09-22 RX ADMIN — ACETAMINOPHEN 975 MG: 325 TABLET ORAL at 01:04

## 2024-09-22 RX ADMIN — HYDRALAZINE HYDROCHLORIDE 50 MG: 50 TABLET ORAL at 08:44

## 2024-09-22 RX ADMIN — SENNOSIDES AND DOCUSATE SODIUM 2 TABLET: 8.6; 5 TABLET ORAL at 19:48

## 2024-09-22 RX ADMIN — OXYCODONE HYDROCHLORIDE 2.5 MG: 5 TABLET ORAL at 01:03

## 2024-09-22 RX ADMIN — Medication 2 SPRAY: at 01:05

## 2024-09-22 RX ADMIN — DISOPYRAMIDE PHOSPHATE 100 MG: 100 CAPSULE, GELATIN COATED ORAL at 17:36

## 2024-09-22 RX ADMIN — ACETAMINOPHEN 975 MG: 325 TABLET ORAL at 18:41

## 2024-09-22 RX ADMIN — ATENOLOL 50 MG: 50 TABLET ORAL at 19:49

## 2024-09-22 RX ADMIN — DISOPYRAMIDE PHOSPHATE 100 MG: 100 CAPSULE, GELATIN COATED ORAL at 01:03

## 2024-09-22 RX ADMIN — WARFARIN SODIUM 2 MG: 2 TABLET ORAL at 17:36

## 2024-09-22 RX ADMIN — DISOPYRAMIDE PHOSPHATE 100 MG: 100 CAPSULE, GELATIN COATED ORAL at 08:44

## 2024-09-22 RX ADMIN — LEVOTHYROXINE SODIUM 75 MCG: 75 TABLET ORAL at 08:44

## 2024-09-22 RX ADMIN — FAMOTIDINE 20 MG: 20 TABLET ORAL at 08:44

## 2024-09-22 RX ADMIN — ATENOLOL 50 MG: 50 TABLET ORAL at 08:44

## 2024-09-22 RX ADMIN — OXYCODONE HYDROCHLORIDE 2.5 MG: 5 TABLET ORAL at 18:41

## 2024-09-22 ASSESSMENT — ACTIVITIES OF DAILY LIVING (ADL)
ADLS_ACUITY_SCORE: 27
ADLS_ACUITY_SCORE: 27
ADLS_ACUITY_SCORE: 28
ADLS_ACUITY_SCORE: 39
ADLS_ACUITY_SCORE: 39
ADLS_ACUITY_SCORE: 28
ADLS_ACUITY_SCORE: 28
ADLS_ACUITY_SCORE: 27
ADLS_ACUITY_SCORE: 39
ADLS_ACUITY_SCORE: 39
ADLS_ACUITY_SCORE: 27
ADLS_ACUITY_SCORE: 39
ADLS_ACUITY_SCORE: 27
ADLS_ACUITY_SCORE: 28
ADLS_ACUITY_SCORE: 39
ADLS_ACUITY_SCORE: 28

## 2024-09-22 NOTE — PLAN OF CARE
Goal Outcome Evaluation:      Plan of Care Reviewed With: patient    Overall Patient Progress: no changeOverall Patient Progress: no change    Outcome Evaluation: Pt still complaining of severe pain    A&Ox4, hypertensive into the 170s, provider notified , no new orders. OVSS on RA. Pain managed with scheduled Tylenol and prn Oxy 2.5 mg x1 with minimal relief. Denies nausea, CP or SOB. Purewick overnight with good urine output. Repositioned and offered commode, did void, no vaginal bleeding noted. No acute events overnight, continue to monitor with POC.

## 2024-09-22 NOTE — PROVIDER NOTIFICATION
"Txted Baldev Ventura MD via [facility] at 2021 -     \"pt is experiencing vertigo again, she feels like her bed was upside down and if she moved she'd falls even though she in bed, now that I'm in the room she doesn't feel like her is upside down.\"      MD assessed pt the bedside   "

## 2024-09-22 NOTE — PROGRESS NOTES
"Brief progress note    At the bedside for PM rounds. Per RN, patient woke up with vertigo but was able to reorient herself when RN entered the room. After talking with the patient, she describes it like she is \"standing upside-down on the ceiling and if I step off, I'd fall,\" however when the patient wakes up more after someone enters the room, she's able to reorient herself to the situation and the feeling of standing on the ceiling goes away. Patient not bothered by it since she's able to reorient herself. Will hold off on meclizine for the time being, as the sensation resolves on its own.    Baldev Ventura MD  Gynecologic-Oncology, PGY-2   09/21/2024 9:14 PM    Gyn-Onc pager: [phone]   "

## 2024-09-22 NOTE — PLAN OF CARE
[id number]    Activity:   assist of 1  LDA's:  Peripheral  Vitals/Pain/Neuro: A&Ox4, less pain today/no nausea, VSS on RA but tachy.   GI/: Voids spontaneously without difficulty, no BM  Diet:  Minced and moist while sitting upright in high back chair.   Skin:  L heel and ankle has 3 sores.     Other Cares/Comments: Pt was pretty tired during shift and napped on and off stating she didn't sleep last night. She also requests having the boor slightly open at all times. Discharge pending TCU placement       Goal Outcome Evaluation:      Plan of Care Reviewed With: patient    Overall Patient Progress: improvingOverall Patient Progress: improving    Outcome Evaluation: POD #3

## 2024-09-23 ENCOUNTER — APPOINTMENT (OUTPATIENT)
Dept: SPEECH THERAPY | Facility: CLINIC | Age: 84
DRG: 740 | End: 2024-09-23
Payer: COMMERCIAL

## 2024-09-23 LAB
GLUCOSE BLDC GLUCOMTR-MCNC: 100 MG/DL (ref 70–99)
GLUCOSE BLDC GLUCOMTR-MCNC: 105 MG/DL (ref 70–99)
GLUCOSE BLDC GLUCOMTR-MCNC: 111 MG/DL (ref 70–99)
INR PPP: 1.76 (ref 0.85–1.15)

## 2024-09-23 PROCEDURE — 250N000013 HC RX MED GY IP 250 OP 250 PS 637

## 2024-09-23 PROCEDURE — 120N000002 HC R&B MED SURG/OB UMMC

## 2024-09-23 PROCEDURE — 250N000013 HC RX MED GY IP 250 OP 250 PS 637: Performed by: OBSTETRICS & GYNECOLOGY

## 2024-09-23 PROCEDURE — 85610 PROTHROMBIN TIME: CPT

## 2024-09-23 PROCEDURE — 36415 COLL VENOUS BLD VENIPUNCTURE: CPT

## 2024-09-23 PROCEDURE — 92526 ORAL FUNCTION THERAPY: CPT | Mod: GN | Performed by: REHABILITATION PRACTITIONER

## 2024-09-23 RX ORDER — POLYETHYLENE GLYCOL 3350 17 G/17G
17 POWDER, FOR SOLUTION ORAL DAILY
Status: DISCONTINUED | OUTPATIENT
Start: 2024-09-23 | End: 2024-09-24 | Stop reason: HOSPADM

## 2024-09-23 RX ORDER — BISACODYL 10 MG
10 SUPPOSITORY, RECTAL RECTAL DAILY PRN
DISCHARGE
Start: 2024-09-23

## 2024-09-23 RX ORDER — SALIVA STIMULANT COMB. NO.3
1-2 SPRAY, NON-AEROSOL (ML) MUCOUS MEMBRANE
DISCHARGE
Start: 2024-09-23

## 2024-09-23 RX ORDER — WARFARIN SODIUM 2 MG/1
2 TABLET ORAL
Status: COMPLETED | OUTPATIENT
Start: 2024-09-23 | End: 2024-09-23

## 2024-09-23 RX ORDER — AMOXICILLIN 250 MG
2 CAPSULE ORAL 2 TIMES DAILY PRN
DISCHARGE
Start: 2024-09-23

## 2024-09-23 RX ORDER — OXYCODONE HYDROCHLORIDE 5 MG/1
2.5-5 TABLET ORAL EVERY 6 HOURS PRN
Qty: 6 TABLET | Refills: 0 | Status: SHIPPED | OUTPATIENT
Start: 2024-09-23

## 2024-09-23 RX ORDER — POLYETHYLENE GLYCOL 3350 17 G/17G
17 POWDER, FOR SOLUTION ORAL DAILY
DISCHARGE
Start: 2024-09-23

## 2024-09-23 RX ORDER — LIDOCAINE 4 G/G
1 PATCH TOPICAL EVERY 24 HOURS
Status: DISCONTINUED | OUTPATIENT
Start: 2024-09-23 | End: 2024-09-24 | Stop reason: HOSPADM

## 2024-09-23 RX ORDER — ACETAMINOPHEN 325 MG/1
650 TABLET ORAL EVERY 6 HOURS PRN
DISCHARGE
Start: 2024-09-23

## 2024-09-23 RX ADMIN — LIDOCAINE 1 PATCH: 4 PATCH TOPICAL at 04:10

## 2024-09-23 RX ADMIN — PREGABALIN 100 MG: 50 CAPSULE ORAL at 09:29

## 2024-09-23 RX ADMIN — OXYCODONE HYDROCHLORIDE 5 MG: 5 TABLET ORAL at 04:09

## 2024-09-23 RX ADMIN — LEVOTHYROXINE SODIUM 75 MCG: 75 TABLET ORAL at 09:20

## 2024-09-23 RX ADMIN — DISOPYRAMIDE PHOSPHATE 100 MG: 100 CAPSULE, GELATIN COATED ORAL at 18:08

## 2024-09-23 RX ADMIN — ACETAMINOPHEN 975 MG: 325 TABLET ORAL at 01:01

## 2024-09-23 RX ADMIN — HYDRALAZINE HYDROCHLORIDE 50 MG: 50 TABLET ORAL at 20:55

## 2024-09-23 RX ADMIN — PREGABALIN 100 MG: 50 CAPSULE ORAL at 20:56

## 2024-09-23 RX ADMIN — DISOPYRAMIDE PHOSPHATE 100 MG: 100 CAPSULE, GELATIN COATED ORAL at 09:20

## 2024-09-23 RX ADMIN — DISOPYRAMIDE PHOSPHATE 100 MG: 100 CAPSULE, GELATIN COATED ORAL at 00:44

## 2024-09-23 RX ADMIN — POLYETHYLENE GLYCOL 3350 17 G: 17 POWDER, FOR SOLUTION ORAL at 04:09

## 2024-09-23 RX ADMIN — ACETAMINOPHEN 975 MG: 325 TABLET ORAL at 21:02

## 2024-09-23 RX ADMIN — FAMOTIDINE 20 MG: 20 TABLET ORAL at 09:29

## 2024-09-23 RX ADMIN — ATENOLOL 50 MG: 50 TABLET ORAL at 20:56

## 2024-09-23 RX ADMIN — OXYCODONE HYDROCHLORIDE 5 MG: 5 TABLET ORAL at 20:56

## 2024-09-23 RX ADMIN — OXYCODONE HYDROCHLORIDE 5 MG: 5 TABLET ORAL at 00:44

## 2024-09-23 RX ADMIN — ACETAMINOPHEN 975 MG: 325 TABLET ORAL at 09:28

## 2024-09-23 RX ADMIN — ACETAMINOPHEN 975 MG: 325 TABLET ORAL at 14:03

## 2024-09-23 RX ADMIN — SENNOSIDES AND DOCUSATE SODIUM 1 TABLET: 8.6; 5 TABLET ORAL at 09:21

## 2024-09-23 RX ADMIN — SENNOSIDES AND DOCUSATE SODIUM 1 TABLET: 8.6; 5 TABLET ORAL at 20:56

## 2024-09-23 RX ADMIN — HYDRALAZINE HYDROCHLORIDE 50 MG: 50 TABLET ORAL at 09:20

## 2024-09-23 RX ADMIN — ATENOLOL 50 MG: 50 TABLET ORAL at 09:20

## 2024-09-23 RX ADMIN — WARFARIN SODIUM 2 MG: 2 TABLET ORAL at 18:08

## 2024-09-23 ASSESSMENT — ACTIVITIES OF DAILY LIVING (ADL)
ADLS_ACUITY_SCORE: 37
ADLS_ACUITY_SCORE: 38
ADLS_ACUITY_SCORE: 31
ADLS_ACUITY_SCORE: 38
ADLS_ACUITY_SCORE: 38
ADLS_ACUITY_SCORE: 37
ADLS_ACUITY_SCORE: 37
ADLS_ACUITY_SCORE: 39
ADLS_ACUITY_SCORE: 37
ADLS_ACUITY_SCORE: 38
ADLS_ACUITY_SCORE: 39
ADLS_ACUITY_SCORE: 39
ADLS_ACUITY_SCORE: 37
ADLS_ACUITY_SCORE: 38
ADLS_ACUITY_SCORE: 31
ADLS_ACUITY_SCORE: 39
ADLS_ACUITY_SCORE: 31
ADLS_ACUITY_SCORE: 39
ADLS_ACUITY_SCORE: 31
ADLS_ACUITY_SCORE: 31
ADLS_ACUITY_SCORE: 37

## 2024-09-23 NOTE — CONSULTS
I, Zach Montez, staff  have reviewed and agree with the following  student's note on 9/24/2024 at 12:55 PM.     SPIRITUAL [facility] SERVICES Consult Note  ([place]) U5A  Referral Source/Reason for Visit: Routine Consult  Summary and Recommendations -     Hortensia stated that her goal is to get better so that she can return to her home. She said that she is going to a good TCU that she hopes will have Church resources for her. Hortensia's homero is important to her and she values and welcomes prayer.     PLAN: Chaplains are available upon request    Monik Arredondo  Chaplain Resident      [facility] is available 24/7 for emergent requests and consults, either by paging the on-call  or by entering an ASAP/STAT consult in [employer], which will also page the on-call .    Assessment    Saw pt Hortensia A Grams    Patient/Family Understanding of Illness and Goals of Care -   Hortensia's goal is to get better so she can return home, to her friends and the life she has. She understands that she needs to go to a TCU and hopes that the place she is going will have Church resources for her.    Distress and Loss -   The new cancer diagnosis brings an added challenge for Hortensia.  Hortensia is feeling the losses of old age and her independence; having to rely on others to do what she used to do for herself and others.  Hortensia spoke about the challenge of distance from her older daughter and how she has navigated that with help from her [employer] community.    Strengths, Coping, and Resources -   Hortensia has care and support from her [employer] community and friends.  When Hortensia is in her home, she has neighbor friends, [employer] and groceries delivery services that make it possible for her to live on her own.  Hortensia has both her younger daughter and a close friend who help with making sure she has the care she needs.    Meaning, Beliefs, and Spirituality -   Hortensia's Church homero is  very important to her.  She is a part of [employer] in [place].  Prayer is meaningful to Hortensia and she welcomed prayer when I offered it.

## 2024-09-23 NOTE — PLAN OF CARE
[id number]    Activity:   assist of 2  LDA's:  Peripheral  Vitals/Pain/Neuro: A&Ox4, Denies nausea, pain in abdomen controlled with scheduled Tylenol, tachy on RA but within parameters   GI/: Voids spontaneously without difficulty on bedside commode  Diet: Minced and moist while sitting upright, tolerating well  Skin: 3 sores on L heel/ankle covered as ordered    Other Cares/Comments: More alert today.  Discharge to TCU tomorrow        Goal Outcome Evaluation:      Plan of Care Reviewed With: patient    Overall Patient Progress: improvingOverall Patient Progress: improving    Outcome Evaluation: POD#4, discharge tomorrow

## 2024-09-23 NOTE — PROGRESS NOTES
Care Management Follow Up    Length of Stay (days): 5    Expected Discharge Date: 09/24/2024     Concerns to be Addressed: discharge planning     Patient plan of care discussed at interdisciplinary rounds: Yes    Anticipated Discharge Disposition: Transitional Care     Anticipated Discharge Services: None  Anticipated Discharge DME: None    Patient/family educated on Medicare website which has current facility and service quality ratings: yes Social Work provided the patient with a printed list of Transitional Care Units from the Medicare.gov website to review.  Education Provided on the Discharge Plan: Yes  Patient/Family in Agreement with the Plan: yes    Referrals Placed by CM/SW:  Referrals to Transitional Care Units    The following facility has accepted the patient: Patient has decided to discharge to the following TCU:    [facility]  [address] [place] [address]  Main Facility Phone Number: [phone]   Admissions Phone Number: [phone]  Fax: [phone]   >>9/23 Social work called and left voicemail with admissions at 8:39 am to follow up on referral, requested call back. Social work spoke with Hilda with admissions at 10:12 am. She indicated the patient is accepted pending [employer] Authorization. Social work updated Hilda with admissions at 10:47 am over the phone to inform her that the patient has selected their facility for TCU placement.    _______________________________________    The patient has decided not to discharge to the following accepting facility.     [facility]   [address] [place] [address]  [facility] Facility Liaison Alicia Joyner; P: [phone]; F: [phone]; E: [email or web address])  >>9/23  Social work called and spoke with Alicia with admissions at 8:45 am, no beds at [facility] until the 9/26. Social work received call from Alicia elmore [facility]missions Liaison at 9 am. [facility] indicates [facility] but they can accept the patient at the [facility] location ([address] [place] [address]) if the patient does not have any plans for cancer treatment. Per Gyn/Oncology provider, there is no plan for cancer treatment at [facility]. Alicia also indicates that they will need insurance authorization. Social work called and updated Alicia at 10:35 am to inform her that the patient has selected another facility.     ___________________________________    The following facilities have referrals pending:     [facility]  [address] [place]  [address]  P: [phone] - Admissions  F: [phone]  >>9/23 Social work called and left voicemail with admissions at 8:35 am to follow up on referral, requested call back.     [address] [address]  Admissions: [phone]  >>9/23 Social work called and left voicemail with admissions at 8:37 am to follow up on referral, requested call back.     [address]  [address]  P: [phone]  Admissions: [phone]  F: [phone]  >>9/23 Social work called and left voicemail with admissions at 8:40 am to follow up on referral, requested call back.     [facility]  [address] [place] [address]  Ph: [phone]  F: [phone]  >>9/23 Social work called and left voicemail with admissions to follow up on referral at 8:45 am, requested call back. Social work received call from Ирина with admissions at 8:55 am. She indicates she would like the referral sent again. Social Work sent referral again via EPIC.     Private pay costs discussed: Not applicable    Discussed  Partnership in Safe Discharge Planning  document with patient/family: No     Handoff Completed: No, handoff not indicated or clinically appropriate    Additional Information: Social work followed up with pending [facility] referrals. Patient [facility]  [facility] TCU in [place].     Social work met with the patient at bedside and informed her of acceptance at the two Transitional Care Untis. Patient prefers to discharge to [facility] TCU in [place].     Social work updated Junior Robertson Gyn/Onc provider at 11:05 am to inform her of the acceptance at TCU pending insurance authorization.     Addendum 4:25 pm: Social work received message from Hilda with admissions at [facility]. Hilda indicated that they have received insurance authorization. Admissions requested a ride to be arranged for tomorrow 9/24 between 1 pm and 7 pm.     Next Steps: [facility] CHW to complete PAS. SW will need to arrange a ride with [employer] [facility] for  on Tuesday 9/24 between 1 pm - 7 pm.     Yojana Cantu MSW, LGSW  5A/5C  Covering 5A beds:[id number]  5C beds 3823-2646 (no BMT pt's)   Unit  Phone Number [phone]  [facility]  Social Work & Care Management Department

## 2024-09-23 NOTE — PROGRESS NOTES
"Gynecology Oncology Progress Note  September 23, 2024    24H:  - improved mentation, mobility  - increasing abdominal pain overnight, refractory to tylenol, oxycodone, lidocaine patch; improved w/lidocaine patch, oxycodone    Subjective:   Doing ok this morning, oriented to self, place and time. No vertigo. Patient reports her pain from overnight has improved a little, she had just fallen asleep when the lab came to check INR. Her pain is now 7-8/10. Denies fevers, chills, chest pain, SOB. Voiding spontaneously, ongoing baseline mild incontinence. Passing flatus, no BM since prior to surgery. Tolerating minced/diced diet without nausea/vomiting.    Objective:   BP (!) 170/71 (BP Location: Left arm)   Pulse 100   Temp 98.3  F (36.8  C) (Oral)   Resp 20   Ht 1.676 m (5' 6\")   Wt 80.6 kg (177 lb 9.6 oz)   SpO2 97%   BMI 28.67 kg/m    General: NAD  CV: RRR, well perfused  Resp: No increased work of breathing on room air  Abdomen: soft, appropriately tender, mildly distended  Incisions: Laparoscopic incisions with skin well approximated and overlying dermabond C/D/I without adjacent erythema/edema  Extremities: nontender, no edema, left heel with bandage; LLE with purple ecchymosis along medial aspect, patient states this is baseline    I/Os  (Yesterday // Since midnight)  PO: 440 mL // not documented  IV: 0 // 0  Urine: 3025 mL//200 mL    UOP: 0.83 ml/kg/hr since midnight    New labs/imaging-  Daily INR for warfarin dosing, this morning's pending    Assessment/Plan: Hortensia Xie is an 84 year old with PMH of partial colectomy, stroke, sinus node dysfunction w/ cardiac pacemaker in place, atrial fibrillation on anti-coagulation, Type 2 diabetes, chronic pain on opioid therapy, and hypertension with newly diagnosed endometrial cancer. She was admitted for vaginal bleeding and underwent planned hysterectomy, is now HD#6 and POD#4 s/p TLH, BSO, LEIA, vag lac repair, excision of small bowel nodule.      # Vaginal " bleeding  # Endometrial cancer  # Postoperative state  FEN: Minced/moist diet, thin liquids per SLP. Tolerating well w/o n/v  Pain: Scheduled tylenol and PTA lyrica, PRN oxy   Heme: Hgb improved prior to surgery and surgery with minimal blood loss. Hgb stable, VSS.  GI: Scheduled bowel regimen. PRN antiemetics. Has not had a bowel movement since surgery, miralax added this morning.   : S/p solis, voiding spontaneously    # Left heel wound  Appreciate WOC recommendations. Daily RN care as follows (ordered):  Cleanse wounds with MicroKlenz wound cleanser and pat dry.  Apply nickel thick amount of Medihoney (#[id number]) on areas that will cover the wound bed to a Primapore dressing.  Apply to wound.   Keep heels lifted off bed.    # Intermittent vertigo, improved  Suspect vertigo is more likely a manifestation of delirium, For patient comfort, okay to give Meclizine sparingly, limit to one dose/day and order only as needed.      # Type 2 Diabetes, not on medication  - Patient has previously declined medication  - Sliding scale insulin held given mostly normal sugars, POCT glucose 4x daily while in house  - Hypoglycemia precautions     # HTN  - PTA atenolol continued in house  - PTA hydralazine restarted HD#2 due to elevated blood pressures     # Atrial fibrillation  # Pacemaker in situ  - Pharmacy consulted, PTA warfarin resumed HD#3, managed by pharm  - PTA disopyramide continued in house     # History of stroke  # Chronic pain 2/2 stroke  - PTA Lyrica continued in house  - Mary Tylenol, home lyrica, and PRN oxycodone for pain - patient takes Percocet at home.  - Fall precautions & delirium precautions in place     # Hypothyroidism  - PTA levothyroxine continued in house    Disposition: Patient meeting postoperative goals, TCU placement pending per PT/OT recs.     Cristal Zhu MD  Obstetrics & Gynecology, PGY-3  09/23/2024 7:30 AM    Gyn-Onc pager: [phone]

## 2024-09-23 NOTE — PLAN OF CARE
"Goal Outcome Evaluation:       BP (!) 173/67 (BP Location: Left arm)   Pulse 69   Temp 98.3  F (36.8  C) (Oral)   Resp 18   Ht 1.676 m (5' 6\")   Wt 80.6 kg (177 lb 9.6 oz)   SpO2 97%   BMI 28.67 kg/m      POD 4  s/p TLH, BSO, LEIA, vag lac repair, excision of small bowel nodule     Pt is  A &O  X4, with intermittent confusion, VSS, except BP cont to be elevated, MD updated,  did c/o pain, PRN tylenol and PRN oxycodone administer. Lidocaine patch applied to abdomen also ice pack for comfort. Voiding spon, is occasionally incont of both bladder. Passing gas, no bm yet, miralax administer.  Dressing change to left ankle done this morning.  Midline incision, with 5 laps sites DASHA, with liquid bandage. BS check done, cont monitor.                 "

## 2024-09-23 NOTE — PROGRESS NOTES
Care Management Follow Up    Length of Stay (days): 5    Expected Discharge Date: 09/24/2024     Concerns to be Addressed: discharge planning     Patient plan of care discussed at interdisciplinary rounds: Yes    Anticipated Discharge Disposition: Transitional Care     Anticipated Discharge Services: None  Anticipated Discharge DME: None    Patient/family educated on Medicare website which has current facility and service quality ratings: yes (Social Work provided the patient with a printed list of Transitional Care Units from the Medicare.gov website to review.)  Education Provided on the Discharge Plan: Yes  Patient/Family in Agreement with the Plan: yes    Referrals Placed by CM/SW:  (Referrals to Transitional Care Units)  Private pay costs discussed: Not applicable    Discussed  Partnership in Safe Discharge Planning  document with patient/family: Yes    Handoff Completed: No, handoff not indicated or clinically appropriate    Additional Information:    CHW completed a preadmission screening on Senior Linkage Line for the accepting facility. PAS number provided to [employer].    [id number]    CHW to follow up if needed.         Trinidad Sanchez  5A/5C Community Health Worker  [place], [place]  [email or web address]  [phone]

## 2024-09-23 NOTE — PROGRESS NOTES
"Brief Gyn Onc Progress Note    Notified by RN that patient complaining of increased abdominal pain, refractory to scheduled tylenol and 5mg prn oxycodone. To bedside to evaluate.     Hortensia is oriented to person, place, time. She reports ongoing abdominal pain rated 8-9/10 that is preventing her from sleeping, she has the TV on as noise to distract her from the pain. She was able to empty her bladder but that did not help the pain. She thinks she is passing flatus.     BP (!) 173/67 (BP Location: Left arm)   Pulse 69   Temp 98.3  F (36.8  C) (Oral)   Resp 18   Ht 1.676 m (5' 6\")   Wt 80.6 kg (177 lb 9.6 oz)   SpO2 97%   BMI 28.67 kg/m    Gen: resting in bed, appears uncomfortable when flat on her back, using arm to pull herself onto her side  CV: RRR  Pulm: normal work of breathing on room air  Abd: overall soft, however with a central infraumbilical area of firmness about 15cm wide by 15-20cm long; difficult to appreciate whether this is muscular tightness from guarding or if it is underlying firmness. Area of tenderness to palpation in LLQ, otherwise overall non-tender to palpation. Incisions without adjacent erythema or edema; overlying dermabond c/d/I.  Neuro: A&O x3    Patient rates pain 8-9/10, which is similar to recent baseline of 8/10, however appears somewhat more uncomfortable and it is unusual for her pain to prohibit her from sleeping. She is passing flatus however has not had a BM recently, so this pain could be in part due to constipation. Discussed with patient and offered lidocaine patch and ice pack, which she would like to try.   - Lidocaine patch for abdomen  - Ice pack, ok to remove if unhelpful  - Miralax added to encourage BM  - If pain is refractory to these measures, ok to give additional PO oxycodone 2.5mg. Patient will be due for prn oxycodone 5mg at 0444, so if she is given 2.5mg oxy prior to that but pain continues, please also offer additional 2.5 (for total of 5) at " 0444.    Cristal Zuh MD  Obstetrics & Gynecology, PGY-3  09/23/2024 3:43 AM

## 2024-09-23 NOTE — PROVIDER NOTIFICATION
Provider notified (name): gyn/onc resident   Reason for notification:PT BP cont to be elevated 178/68 and she cont to c/o severe pain, at incisional site, prn oxycodone and prn tylenol administer but to no avail  Recommendation/request given to provider: bp med and pain meds   Response from provider: cont to monitor BP, give additional po oxy

## 2024-09-23 NOTE — PLAN OF CARE
Goal Outcome Evaluation:    POD# 3 s/p total laparoscopic hysterectomy, bilateral salpingo-oophorectomy, lysis of adhesions repair of Vaginal Laceration, Excision of Small Bowel Nodule.     Patient is A&O x 4, intermittent confusion. Reassurance and emotional support provided. Reports good pain control with current regimen, denies nausea. Tolerating Minced/Moist diet, good PO intake. Pt ate 100% of her dinner meal. PIV x 2 patent. Pt is voiding spontaneously in commode, reports negative flatus, no BM. No vaginal bleeding noted. Pt stand by assist of 2, GB, and walker. Left foot dressing changed. Bed alarm on, call light in reach. Plan is to discharge to TCU.

## 2024-09-24 VITALS
TEMPERATURE: 97.7 F | WEIGHT: 176.59 LBS | HEIGHT: 66 IN | RESPIRATION RATE: 18 BRPM | SYSTOLIC BLOOD PRESSURE: 150 MMHG | BODY MASS INDEX: 28.38 KG/M2 | DIASTOLIC BLOOD PRESSURE: 63 MMHG | HEART RATE: 76 BPM | OXYGEN SATURATION: 98 %

## 2024-09-24 PROBLEM — C54.1 ENDOMETRIAL CANCER (H): Status: ACTIVE | Noted: 2024-09-24

## 2024-09-24 LAB
GLUCOSE BLDC GLUCOMTR-MCNC: 95 MG/DL (ref 70–99)
INR PPP: 2.01 (ref 0.85–1.15)

## 2024-09-24 PROCEDURE — 36415 COLL VENOUS BLD VENIPUNCTURE: CPT

## 2024-09-24 PROCEDURE — 250N000013 HC RX MED GY IP 250 OP 250 PS 637

## 2024-09-24 PROCEDURE — 250N000013 HC RX MED GY IP 250 OP 250 PS 637: Performed by: OBSTETRICS & GYNECOLOGY

## 2024-09-24 PROCEDURE — 250N000013 HC RX MED GY IP 250 OP 250 PS 637: Performed by: NURSE PRACTITIONER

## 2024-09-24 PROCEDURE — 85610 PROTHROMBIN TIME: CPT

## 2024-09-24 RX ORDER — HYDRALAZINE HYDROCHLORIDE 50 MG/1
50 TABLET, FILM COATED ORAL EVERY 8 HOURS SCHEDULED
Status: DISCONTINUED | OUTPATIENT
Start: 2024-09-24 | End: 2024-09-24 | Stop reason: HOSPADM

## 2024-09-24 RX ORDER — HYDRALAZINE HYDROCHLORIDE 50 MG/1
50 TABLET, FILM COATED ORAL EVERY 8 HOURS SCHEDULED
Status: DISCONTINUED | OUTPATIENT
Start: 2024-09-24 | End: 2024-09-24

## 2024-09-24 RX ORDER — HYDRALAZINE HYDROCHLORIDE 50 MG/1
50 TABLET, FILM COATED ORAL EVERY 8 HOURS
DISCHARGE
Start: 2024-09-24

## 2024-09-24 RX ORDER — WARFARIN SODIUM 2 MG/1
2 TABLET ORAL
Status: DISCONTINUED | OUTPATIENT
Start: 2024-09-24 | End: 2024-09-24 | Stop reason: HOSPADM

## 2024-09-24 RX ORDER — PREGABALIN 100 MG/1
100 CAPSULE ORAL 2 TIMES DAILY
Qty: 60 CAPSULE | Refills: 2 | Status: SHIPPED | OUTPATIENT
Start: 2024-09-24

## 2024-09-24 RX ADMIN — PREGABALIN 100 MG: 50 CAPSULE ORAL at 08:09

## 2024-09-24 RX ADMIN — LEVOTHYROXINE SODIUM 75 MCG: 75 TABLET ORAL at 08:09

## 2024-09-24 RX ADMIN — FAMOTIDINE 20 MG: 20 TABLET ORAL at 08:09

## 2024-09-24 RX ADMIN — OXYCODONE HYDROCHLORIDE 5 MG: 5 TABLET ORAL at 08:26

## 2024-09-24 RX ADMIN — DISOPYRAMIDE PHOSPHATE 100 MG: 100 CAPSULE, GELATIN COATED ORAL at 01:45

## 2024-09-24 RX ADMIN — ATENOLOL 50 MG: 50 TABLET ORAL at 08:09

## 2024-09-24 RX ADMIN — ACETAMINOPHEN 975 MG: 325 TABLET ORAL at 01:45

## 2024-09-24 RX ADMIN — DISOPYRAMIDE PHOSPHATE 100 MG: 100 CAPSULE, GELATIN COATED ORAL at 08:26

## 2024-09-24 RX ADMIN — Medication 2 SPRAY: at 13:03

## 2024-09-24 RX ADMIN — ACETAMINOPHEN 975 MG: 325 TABLET ORAL at 09:52

## 2024-09-24 RX ADMIN — HYDRALAZINE HYDROCHLORIDE 50 MG: 50 TABLET ORAL at 08:26

## 2024-09-24 ASSESSMENT — ACTIVITIES OF DAILY LIVING (ADL)
ADLS_ACUITY_SCORE: 32
ADLS_ACUITY_SCORE: 35
ADLS_ACUITY_SCORE: 32
ADLS_ACUITY_SCORE: 31
ADLS_ACUITY_SCORE: 32
ADLS_ACUITY_SCORE: 35
ADLS_ACUITY_SCORE: 31
ADLS_ACUITY_SCORE: 32
ADLS_ACUITY_SCORE: 31
ADLS_ACUITY_SCORE: 32

## 2024-09-24 NOTE — PLAN OF CARE
Goal Outcome Evaluation:      Plan of Care Reviewed With: patient    Overall Patient Progress: improvingOverall Patient Progress: improving    Outcome Evaluation: Transfering to TCU today    Nursing Focus: Discharge    D: Patient transferred to [facility] at 1319. Patient accompanied by [facility] transport.    I: All discharge medications and instructions reviewed with the patient. Other phone numbers to call with questions or concerns after discharge reviewed. PIV x2 removed. Education completed. Face sheet, packet, and inpatient prescription given to transport.     A: Patient verbalized understanding of discharge medications and instructions.

## 2024-09-24 NOTE — PROGRESS NOTES
"Brief Gyn Onc Progress Note    To bedside to check on Hortensia. She is sitting up, getting ready to eat dinner. She reports she is doing well, feeling hungry, pain well controlled. No nausea/vomiting. Passing flatus and voiding spontaneously.     BP (!) 160/67 (BP Location: Right arm)   Pulse 70   Temp 98.3  F (36.8  C) (Oral)   Resp 20   Ht 1.676 m (5' 6\")   Wt 80.1 kg (176 lb 9.4 oz)   SpO2 98%   BMI 28.50 kg/m    Gen: sitting upright in bed, appears comfortable  CV: RRR  Pulm: normal work of breathing on room air    Will check INR in morning per Pharmacy's management of warfarin. Meeting goals for discharge to TCU, anticipate discharge 9/24 pending TCU availability. Social Work/CC aware.    Please page Gyn Onc with any questions or concerns.     Cristal Zhu MD  Obstetrics & Gynecology, PGY-3  09/23/2024 7:55 PM    Gyn Onc team pager: [phone]    "

## 2024-09-24 NOTE — PROGRESS NOTES
"Gynecology Oncology Progress Note  September 24, 2024    24H:  - med-large BM   - Increasing BP/worsening hypertension    Subjective:   Doing well this morning, oriented to self, place and time. Endorses mild vertigo, tolerable. Pain is absent. Denies fevers, chills, chest pain, SOB. Voiding spontaneously, ongoing baseline mild incontinence. Passing flatus, she did have a medium to large BM early this morning. Tolerating minced/diced diet without nausea/vomiting.    Objective:   BP (!) 191/76   Pulse 88   Temp 97.7  F (36.5  C) (Oral)   Resp 18   Ht 1.676 m (5' 6\")   Wt 80.1 kg (176 lb 9.4 oz)   SpO2 97%   BMI 28.50 kg/m    General: NAD  CV: RRR, well perfused  Resp: No increased work of breathing on room air  Abdomen: soft, appropriately tender, mildly distended  Incisions: Laparoscopic incisions with skin well approximated and overlying dermabond C/D/I without adjacent erythema/edema  Extremities: nontender, no edema, left heel with bandage; LLE with purple ecchymosis along medial aspect    I/Os  (Yesterday // Since midnight)  PO: 740 mL // not recorded   IV: 0 mL  // 0 mL   Urine: 700 mL, + 3x unmeasured // 200 mL     UOP: 0.83 ml/kg/hr since midnight    New labs/imaging-  Daily INR for warfarin dosing, this morning's pending    Assessment/Plan: Hortensia Xie is an 84 year old with PMH of partial colectomy, stroke, sinus node dysfunction w/ cardiac pacemaker in place, atrial fibrillation on anti-coagulation, Type 2 diabetes, chronic pain on opioid therapy, and hypertension with newly diagnosed endometrial cancer. She was admitted for vaginal bleeding and underwent planned hysterectomy, is now HD#7 and POD#5 s/p TLH, BSO, LEIA, vag lac repair, excision of small bowel nodule.      # Vaginal bleeding  # Endometrial cancer  # Postoperative state  FEN: Minced/moist diet, thin liquids per SLP. Tolerating well w/o n/v  Pain: Scheduled tylenol and PTA lyrica, PRN oxy   Heme: Hgb improved prior to surgery and " surgery with minimal blood loss. Hgb stable, VSS.  GI: Bowel movement early this morning after increasing scheduled bowel regimen. PRN antiemetics.  : S/p solis, voiding spontaneously    # Left heel wound  Appreciate WOC recommendations. Daily RN care as follows (ordered):  Cleanse wounds with MicroKlenz wound cleanser and pat dry.  Apply nickel thick amount of Medihoney (#[id number]) on areas that will cover the wound bed to a Primapore dressing.  Apply to wound.   Keep heels lifted off bed.    # Intermittent vertigo, improved  Likely a manifestation of delirium. For patient comfort, okay to give Meclizine sparingly, limit to one dose/day and order only as needed.      # Type 2 Diabetes, not on medication  - Patient has previously declined medication  - Sliding scale insulin held given mostly normal sugars, POCT glucose 4x daily while in house  - Hypoglycemia precautions     # HTN  - PTA atenolol continued in house  - PTA hydralazine restarted HD#2 due to elevated blood pressures. Given she has persistently elevated pressures, we will increase her hydralazine to TID starting today.     # Atrial fibrillation  # Pacemaker in situ  - Pharmacy consulted, PTA warfarin resumed HD#3, managed by pharm  - PTA disopyramide continued in house     # History of stroke  # Chronic pain 2/2 stroke  - PTA Lyrica continued in house  - Mary Tylenol, home lyrica, and PRN oxycodone for pain - patient takes Percocet at home.  - Fall precautions & delirium precautions in place     # Hypothyroidism  - PTA levothyroxine continued in house    Disposition: Patient meeting postoperative goals, TCU placement pending per PT/OT recs.     Cristal Zhu MD  Obstetrics & Gynecology, PGY-3  09/24/2024 6:29 AM    Gyn-Onc pager: [phone]

## 2024-09-24 NOTE — PLAN OF CARE
Goal Outcome Evaluation:    1900-0730:     AVSS, Bp max 191/76, provider notified per orders. Abd pain managed with scheduled tylenol and prn oxy x1. 1 Senna given. Poor appetite, minced/moist diet. Tolerated po meds. Up to commode Ax1 once. Brief in place, changed appropriately. BM x1. IV SL. Door left open per patient comfort. Will continue to follow POC.      Plan of Care Reviewed With: patient    Overall Patient Progress: improvingOverall Patient Progress: improving

## 2024-09-24 NOTE — PLAN OF CARE
Occupational Therapy Discharge Summary    Reason for therapy discharge:    Discharged to transitional care facility.    Progress towards therapy goal(s). See goals on Care Plan in [facility] electronic health record for goal details.  Goals partially met.  Barriers to achieving goals:   discharge from facility.    Therapy recommendation(s):    Continued therapy is recommended.  Rationale/Recommendations:  Pt to benefit from further therapy to progress functional independence with all mobility and ADLs in order to return home safely.

## 2024-09-24 NOTE — PLAN OF CARE
Physical Therapy Discharge Summary    Reason for therapy discharge:    Discharged to transitional care facility.    Progress towards therapy goal(s). See goals on Care Plan in [facility] electronic health record for goal details.  Goals partially met.  Barriers to achieving goals:   discharge from facility.    Therapy recommendation(s):    Continued therapy is recommended.  Rationale/Recommendations:  Recommend pt continue improving remaining impairments at TCU.

## 2024-09-24 NOTE — PHARMACY-ANTICOAGULATION SERVICE
Clinical Pharmacy- Warfarin Discharge Note  This patient is currently on warfarin for the treatment of Atrial fibrillation.  INR Goal= 2-2.5  Expected length of therapy lifetime.    Warfarin PTA Regimen: 2 mg daily      Anticoagulation Dose History  More data exists         Latest Ref Rng & Units 9/18/2024 9/19/2024 9/20/2024 9/21/2024 9/22/2024 9/23/2024 9/24/2024   Recent Dosing and Labs   warfarin ANTICOAGULANT (COUMADIN) 2 MG tablet - - - 2 mg, $Given 2 mg, $Given 2 mg, $Given 2 mg, $Given -   INR 0.85 - 1.15 1.41  1.56  1.53  1.48  1.53  1.76  2.01       Details                   Vitamin K doses administered during the last 7 days: none  FFP administered during the last 7 days: none  Recommend discharging the patient on a warfarin regimen of 2 mg  daily with a prescription for warfarin 2mg tablets.      The patient should have an INR checked  at the end of this week or early next week .

## 2024-09-24 NOTE — PROGRESS NOTES
Care Management Discharge Note    Discharge Date: 09/24/2024       Discharge Disposition: Transitional Care    [facility]  [address] [place] [address]  Main Facility Phone Number: [phone]   Admissions Phone Number: [phone]  RN to RN Ph: [phone]  Fax: [phone]     Discharge Services: None    Discharge DME: None    Discharge Transportation: Wheelchair transportation scheduled via [employer] (ph: [phone] - Lyn[place]) with  window of [phone].     Private pay costs discussed: transportation costs    Does the patient's insurance plan have a 3 day qualifying hospital stay waiver?  No    PAS Confirmation Code:  [id number]   Patient/family educated on Medicare website which has current facility and service quality ratings: yes (Social Work provided the patient with a printed list of Transitional Care Units from the Medicare.gov website to review.)    Education Provided on the Discharge Plan: Yes  Persons Notified of Discharge Plans: Pt, Bedside RN, Charge, Gyn Onc, [facility] Admissions  Patient/Family in Agreement with the Plan: yes    Handoff Referral Completed: No, handoff not indicated or clinically appropriate    Additional Information:  SW following for discharge planning. PT/OT currently recommending TCU. Per Gyn Onc team, pt remains medically ready to discharge today.     ADELSO met with pt at bedside to update on discharge plan. Pt is agreeable and looking forward to going to [facility]. SW shared wheelchair transport arranged and ride  time. SW shared OOP cost for transportation. Pt reported that she is familiar with transportation costs and is agreeable to wheelchair transportation. Pt had no questions or concerns for SW at this time.     Discharge orders faxed to [phone] at 0904.    ADDENDUM 1025: SW left VM to Hilda in Admissions informing her that discharge orders were faxed. SW left call back number in case there were questions or  concerns.     Signed script for Oxycodone faxed to [facility] around 1030.    ADDENDUM 1540: ADELSO received VM from Gabriella valle RN from [facility] noting that they needed a script for pt's Lyrica and requesting call back at [phone].    ADELSO paged Gyn Onc asking if they could still sign script for Lyrica.     ADELSO called [facility] back and spoke with Chris who reported that the provider had called their facility and planned to send the script to [facility]'s pharmacy A&E. Chris reported that they issue was taken care of.     Ciara Dominguez ADELSO Langston   [facility]   Available via Vocera  Covering 5A ADELSO, ph: [phone]

## 2024-09-24 NOTE — PLAN OF CARE
Speech Language Therapy Discharge Summary    Reason for therapy discharge:    Discharged to transitional care facility.    Progress towards therapy goal(s). See goals on Care Plan in [facility] electronic health record for goal details.  Goals partially met.  Barriers to achieving goals:   discharge from facility.    Therapy recommendation(s):    Continued therapy is recommended.  Rationale/Recommendations:  Continued ST for dysphagia as appropriate.    Continue minced/moist (IDDSI 5) and continue thin liquids (IDDSI 0) given set up assist and check in supervision and swallow strategies (fully upright position; chair preferred, small single sips/bites, slow pace)

## 2024-09-25 LAB
LAB DIRECTOR COMMENTS: NORMAL
LAB DIRECTOR DISCLAIMER: NORMAL
LAB DIRECTOR INTERPRETATION: NORMAL
LAB DIRECTOR METHODOLOGY: NORMAL
LAB DIRECTOR RESULTS: NORMAL
SPECIMEN DESCRIPTION: NORMAL

## 2024-09-25 PROCEDURE — 81288 MLH1 GENE: CPT | Performed by: OBSTETRICS & GYNECOLOGY

## 2024-09-25 PROCEDURE — G0452 MOLECULAR PATHOLOGY INTERPR: HCPCS | Mod: 26 | Performed by: PATHOLOGY

## 2024-09-26 ENCOUNTER — PATIENT OUTREACH (OUTPATIENT)
Dept: ONCOLOGY | Facility: CLINIC | Age: 84
End: 2024-09-26
Payer: COMMERCIAL

## 2024-09-26 NOTE — PROGRESS NOTES
[facility]: Transitions of Care Note  Chief Complaint   Patient presents with    Clinic Care Coordination - Post Hospital     Post-op call     Most Recent Admission Date: 9/18/2024   Most Recent Admission Diagnosis: Vaginal bleeding - N93.9     Most Recent Discharge Date: 9/24/2024   Most Recent Discharge Diagnosis: Vaginal bleeding - N93.9  Endometrial cancer - C54.1  Acquired absence of large intestine - Z90.49  Personal history of other diseases of the circulatory system - Z86.79  Pacemaker - Z95.0  Personal history of endocrine disorder - Z86.39  Long-term current use of opiate analgesic - Z79.891  Hypertension, unspecified type - I10  History of stroke - Z86.73     Notes: Writer attempted calling patient x 3 for post-hospital and post-op phone call.  No answer.  Voicemail message was left, encouraging patient to callback.  Provided direct phone number for this RN in message.      Follow up Plan     Future Appointments   Date Time Provider Department Center   10/15/2024 10:30 AM Jameel Miller MD [facility]     Luisito Lerner, RN, BSN, OCN  RN Care Coordinator - Oncology  [facility]

## 2024-09-27 LAB
PATH REPORT.COMMENTS IMP SPEC: ABNORMAL
PATH REPORT.COMMENTS IMP SPEC: YES
PATH REPORT.FINAL DX SPEC: ABNORMAL
PATH REPORT.GROSS SPEC: ABNORMAL
PATH REPORT.MICROSCOPIC SPEC OTHER STN: ABNORMAL
PATH REPORT.RELEVANT HX SPEC: ABNORMAL
PATHOLOGY SYNOPTIC REPORT: ABNORMAL
PHOTO IMAGE: ABNORMAL

## 2024-10-01 NOTE — PROGRESS NOTES
Note entered 10/1/24 per request. Final path was not available on discharge and patient will follow up as scheduled for review of final path with Dr Miller on 10/15/2024.   Final pathology diagnosis as noted below. 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       Final Diagnosis   A. 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 Small bowel, nodule, excision:  - Involved by endometrial adenocarcinoma   Electronically signed by Esperanza Camacho MD on 9/27/2024 at 12:53 PM   Comment  [facility]   The endometrial carcinoma is mismatch repair deficient, MLH1 hypermethylation analysis has been ordered, and the result will be reported separately by the molecular diagnostics lab.   Synoptic Checklist   ENDOMETRIUM   8th Edition - Protocol posted: 12/13/2023ENDOMETRIUM - All Specimens  SPECIMEN   Procedure  Total hysterectomy and bilateral salpingo-oophorectomy   Hysterectomy Type  Laparoscopic   Specimen Integrity  Nodular and hemorrhagic lesion on posterior serosa (corresponds to full-thickness mass myometrial invasion)   TUMOR   Tumor Site  Endometrium   Tumor Size  Greatest Dimension (Centimeters): 5.5 cm   Additional Dimension (Centimeters)  4.6 cm   Histologic Type  Endometrioid carcinoma, NOS   Histologic Grade  FIGO grade 2   Two-Tier Grading System  Low grade  (encompassing FIGO 1 and 2)   Myometrial Invasion  Present   Depth of Myometrial Invasion  21 mm   Myometrial Thickness  21 mm   Percentage of Myometrial Invasion  100 %   Adenomyosis  Not identified   Uterine Serosa Involvement  Present   Lower Uterine Segment Involvement  Present, superficial (non-myoinvasive)   Cervical Stromal Involvement  Not identified   Other Tissue / Organ Involvement  Not identified   Peritoneal / Ascitic Fluid  Not submitted / unknown   Lymphatic and / or Vascular Invasion  Present     Focal (less than 5 vessel involvement): anterior endometrium   REGIONAL LYMPH NODES   Regional Lymph Node Status  Not applicable (no regional lymph nodes submitted or found)   pTNM CLASSIFICATION (AJCC 8th Edition)   Reporting of pT, pN, and (when applicable) pM categories is based on information available to the pathologist at the time the report is issued. As per the AJCC (Chapter 1, 8th Ed.) it is the managing physician s responsibility to establish the final pathologic stage based upon all pertinent information, including but potentially not limited to this pathology report.   pT Category  pT3a   pN Category  pN not assigned (no nodes submitted or found)   pM Category  pM1   Comment(s)  Best block for ancillary studies: A5   .   Gynecologic Biomarker Reporting Template   Protocol posted: 3/22/2023GYNECOLOGIC BIOMARKER REPORTING TEMPLATE - A  TEST(S) PERFORMED   Testing Performed on Block Number(s)  A5   Specimen Type  Resection   Appropriate Controls Verified  Yes        Mismatch Repair (MMR) Protein Status     Nuclear MLH1 Expression  Loss   Mismatch Repair (MMR) Protein Status     Nuclear PMS2 Expression  Loss   Mismatch Repair (MMR) Protein Status     Nuclear MSH2 Expression  Intact   Mismatch Repair (MMR) Protein Status     Nuclear MSH6 Expression  Intact   Mismatch Repair (MMR) Protein Status  Background non-neoplastic tissue / internal control shows intact nuclear expression   Immunohistochemistry  (IHC) Interpretation for Mismatch Repair (MMR) Proteins  Loss of nuclear expression of MLH1 and PMS2: testing for methylation of the MLH1 promoter is indicated (the presence of MLH1 methylation suggests that the tumor is sporadic and germline evaluation is probably not indicated; absence of MLH1 promoter methylation suggests the possibility of Connell syndrome, and sequencing and / or large deletion / duplication testing of germline MLH1 is indicated)        p53 Status  Normal expression (wild type)

## 2024-10-15 ENCOUNTER — VIRTUAL VISIT (OUTPATIENT)
Dept: ONCOLOGY | Facility: CLINIC | Age: 84
End: 2024-10-15
Attending: OBSTETRICS & GYNECOLOGY
Payer: COMMERCIAL

## 2024-10-15 ENCOUNTER — TELEPHONE (OUTPATIENT)
Dept: ONCOLOGY | Facility: CLINIC | Age: 84
End: 2024-10-15
Payer: COMMERCIAL

## 2024-10-15 DIAGNOSIS — C54.1 ENDOMETRIAL CANCER (H): Primary | ICD-10-CM

## 2024-10-15 DIAGNOSIS — Z86.73 HISTORY OF STROKE: ICD-10-CM

## 2024-10-15 DIAGNOSIS — G89.29 CHRONIC LOW BACK PAIN, UNSPECIFIED BACK PAIN LATERALITY, UNSPECIFIED WHETHER SCIATICA PRESENT: ICD-10-CM

## 2024-10-15 DIAGNOSIS — M54.50 CHRONIC LOW BACK PAIN, UNSPECIFIED BACK PAIN LATERALITY, UNSPECIFIED WHETHER SCIATICA PRESENT: ICD-10-CM

## 2024-10-15 PROCEDURE — 99024 POSTOP FOLLOW-UP VISIT: CPT | Mod: 93 | Performed by: OBSTETRICS & GYNECOLOGY

## 2024-10-15 NOTE — TELEPHONE ENCOUNTER
Gynecologic Oncology Telephone Note  Patient scheduled for in person visit. Called patient due to No Show and she was at home and can not get here due to transportation issues but is available to talk. Changed visit to Phone visit. See that encounter for documentation.   Jameel Miller MD

## 2024-10-15 NOTE — LETTER
10/15/2024      Hortensia Xie  [address]  [place] [address]      Dear Colleague,    Thank you for referring your patient, Hortensia Xie, to the [facility]. Please see a copy of my visit note below.    Virtual visit  Time on phone call: 25 minutes  Patient at home. Provider on site.    Gynecologic Oncology Clinic - Established Patient Visit    Visit date: Oct 15, 2024     Reason for visit: pathology review and treatment planning, endometrial cancer    Interval history: Hortensia Xie is a 84 year old post-menopausal patient PMH notable for partial colectomy, stroke, sinus node dysfunction, cardiac pacemaker in place, atrial fibrillation on anti-coagulation, Type 2 diabetes declines medications, chronic pain on opioid therapy, and hypertension here for follow-up after Total laparoscopic hysterectomy, bilateral salpingo-oophorectomy with resection of small bowel tumor nodule and omental biopsy for Grade 2 endometrial cancer.    Final pathology confirmed the nodule was positive for carcinoma. Post-operatively she was discharged to a TCU.     She reports she checked herself out of TCU due to quality of care concerns. She had her 's wife come get her. She is now at home but as a consequence of this does not have any home services. She is still having pain. She is feeling weak. She is homebound and was unable to come for in person appointment today. She reports she is unable to bathe or dress herself without help. She feels she could ally help with dishes and housekeeping. She does not have anyone readily available to help her. She is navigating her stairs but tries to avoid changing levels. She is eating and drinking and having bowel function. She is runing out of her chronic opioid medications.     She sees Rashard Duran in [place] for her chronic pain.      Past Surgical History:  Past Surgical History:   Procedure Laterality Date      SECTION       COLECTOMY  PARTIAL      Primary anastomosis, due to diverticulitis?     IMPLANT PACEMAKER       LAPAROSCOPIC HYSTERECTOMY TOTAL, BILATERAL SALPINGO-OOPHORECTOMY, COMBINED Bilateral 9/19/2024    Procedure: Total laparoscopic hysterectomy, bilateral salpingo-oophorectomy, lysis of adhesions for greater than 60 minutes, Repair of Vaginal Laceration, Excision of Small Bowel Nodule;  Surgeon: Jameel Miller MD;  Location: [facility] OR        Physical Exam:  Phone visit    Pathology:  Lab Results   Component Value Date    CASEREPORT  09/19/2024     Surgical Pathology Report                         Case: [id number]                                  Authorizing Provider:  Jameel Miller MD         Collected:           09/19/2024 10:33 AM          Ordering Location:     [facility] MAIN OR                 Received:            09/19/2024 12:35 PM          Pathologist:           Esperanza Camacho MD                                                    Specimens:   A) - Uterus, Cervix, Bilateral Fallopian Tubes & Ovaries                                            B) - Small Intestine, Small Bowel Nodule                                                   FINALDX  09/19/2024     A. 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 Small bowel, nodule, excision:  - Involved by endometrial adenocarcinoma      SYNOPTIC  09/19/2024     ENDOMETRIUM  ENDOMETRIUM - All Specimens  8th Edition -  Protocol posted: 12/13/2023    SPECIMEN     Procedure:    Total hysterectomy and bilateral salpingo-oophorectomy      Hysterectomy Type:    Laparoscopic      Specimen Integrity:    Nodular and hemorrhagic lesion on posterior serosa (corresponds to full-thickness mass myometrial invasion)     TUMOR     Tumor Site:    Endometrium      Tumor Size:    Greatest Dimension (Centimeters): 5.5 cm       Additional Dimension (Centimeters):    4.6 cm     Histologic Type:    Endometrioid carcinoma, NOS      Histologic Grade:    FIGO grade 2      Two-Tier Grading System:    Low grade (encompassing FIGO 1 and 2)      Myometrial Invasion:    Present        Depth of Myometrial Invasion:    21 mm       Myometrial Thickness:    21 mm       Percentage of Myometrial Invasion:    100 %     Adenomyosis:    Not identified      Uterine Serosa Involvement:    Present      Lower Uterine Segment Involvement:    Present, superficial (non-myoinvasive)      Cervical Stromal Involvement:    Not identified      Other Tissue / Organ Involvement:    Not identified      Peritoneal / Ascitic Fluid:    Not submitted / unknown      Lymphatic and / or Vascular Invasion:    Present        :    Focal (less than 5 vessel involvement): anterior endometrium     REGIONAL LYMPH NODES     Regional Lymph Node Status:    Not applicable (no regional lymph nodes submitted or found)     pTNM CLASSIFICATION (AJCC 8th Edition)     Reporting of pT, pN, and (when applicable) pM categories is based on information available to the pathologist at the time the report is issued. As per the AJCC (Chapter 1, 8th Ed.) it is the managing physician s responsibility to establish the final pathologic stage based upon all pertinent information, including but potentially not limited to this pathology report.     pT Category:    pT3a      pN Category:    pN not assigned (no nodes submitted or found)      pM Category:    pM1        Comment(s):    Best block for ancillary studies: A5    Gynecologic Biomarker Reporting Template  GYNECOLOGIC BIOMARKER REPORTING TEMPLATE - A  Protocol posted: 3/22/2023    TEST(S) PERFORMED     Testing Performed on Block Number(s):    A5      Specimen Type:    Resection      Appropriate Controls Verified:    Yes      Mismatch Repair (MMR) Protein Status:           Nuclear MLH1 Expression:    Loss      Mismatch Repair (MMR) Protein Status:           Nuclear PMS2 Expression:    Loss      Mismatch Repair (MMR) Protein Status:           Nuclear MSH2 Expression:    Intact      Mismatch Repair (MMR) Protein Status:           Nuclear MSH6 Expression:    Intact      Mismatch Repair (MMR) Protein Status:    Background non-neoplastic tissue / internal control shows intact nuclear expression      Immunohistochemistry (IHC) Interpretation for Mismatch Repair (MMR) Proteins:    Loss of nuclear expression of MLH1 and PMS2: testing for methylation of the MLH1 promoter is indicated (the presence of MLH1 methylation suggests that the tumor is sporadic and germline evaluation is probably not indicated; absence of MLH1 promoter methylation suggests the possibility of Connell syndrome, and sequencing and / or large deletion / duplication testing of germline MLH1 is indicated)      p53 Status:    Normal expression (wild type)     ADDITIONAL TESTS PERFORMED   MLH1 Promoter Methylation Analysis:    POSITIVE         ECOG Performance status: 3 (Capable of only limited selfcare; confined to bed or chair more than 50% of waking hours)     Assessment:  Hortensia is a 84 year old with 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     Plan:  -Deconditioning: patient was already having difficulties with self care prior to surgery but underwent surgery due to significant side effects. Self discharged from TCU so now without services at home. Will place home care and  social work consult to evaluate need for services. This has significant impact on her cancer care as it limits our ability to provide standard of care for cancer.     -Chronic pain: difficult to assess by phone but it seems her pain may be related to her underlying chronic pain and underdosing of this medication. However, given recent surgery if she is having worsening pain or pain not relieved by her usual dosing, she should have CT A/P completed to evaluate for other etiology    - Endometrial cancer, stage IVB: we reviewed her pathology which included invasion of small bowel mesentery due to full thickness uterine involvement. This unfortunately places this as an advanced stage cancer. Standard of care would be carboplatin/paclitaxel/checkpoint inhibitor (dMMR); however, given her current performance status, we both agree that chemotherapy would pose significant threat to her life at this juncture. During our conversation she is understandably more concerned about her other health issues and functional status which limited our ability to discuss a more long term plan. But we did discuss the rationale for usual treatment with systemic therapy and the likelihood of recurrence in the future. Her tumor was fully resected at the time of surgery, but microscopic disease still likely remains making recurrence probable. I favor observation at this time and consideration of treatment at recurrence if her functional status has improved.  If her functional status worsens, then it would be reasonable to consider hospice referral if she were amenable.     - Return in 3 mos for visit with me to discuss how things are going and make a long term follow-up plan.     Jameel Miller MD   Gynecologic Oncology     I spent a total of 25 min on the phone with Hortensia on the day of service.      Again, thank you for allowing me to participate in the care of your patient.        Sincerely,        Jameel Miller MD

## 2024-10-15 NOTE — PROGRESS NOTES
Virtual visit  Time on phone call: 25 minutes  Patient at home. Provider on site.    Gynecologic Oncology Clinic - Established Patient Visit    Visit date: Oct 15, 2024     Reason for visit: pathology review and treatment planning, endometrial cancer    Interval history: Hortensia Xie is a 84 year old post-menopausal patient PMH notable for partial colectomy, stroke, sinus node dysfunction, cardiac pacemaker in place, atrial fibrillation on anti-coagulation, Type 2 diabetes declines medications, chronic pain on opioid therapy, and hypertension here for follow-up after Total laparoscopic hysterectomy, bilateral salpingo-oophorectomy with resection of small bowel tumor nodule and omental biopsy for Grade 2 endometrial cancer.    Final pathology confirmed the nodule was positive for carcinoma. Post-operatively she was discharged to a TCU.     She reports she checked herself out of TCU due to quality of care concerns. She had her 's wife come get her. She is now at home but as a consequence of this does not have any home services. She is still having pain. She is feeling weak. She is homebound and was unable to come for in person appointment today. She reports she is unable to bathe or dress herself without help. She feels she could ally help with dishes and housekeeping. She does not have anyone readily available to help her. She is navigating her stairs but tries to avoid changing levels. She is eating and drinking and having bowel function. She is runing out of her chronic opioid medications.     She sees Rashard Duran in [place] for her chronic pain.      Past Surgical History:  Past Surgical History:   Procedure Laterality Date     SECTION      COLECTOMY PARTIAL      Primary anastomosis, due to diverticulitis?    IMPLANT PACEMAKER      LAPAROSCOPIC HYSTERECTOMY TOTAL, BILATERAL SALPINGO-OOPHORECTOMY, COMBINED Bilateral 2024    Procedure: Total laparoscopic hysterectomy, bilateral  salpingo-oophorectomy, lysis of adhesions for greater than 60 minutes, Repair of Vaginal Laceration, Excision of Small Bowel Nodule;  Surgeon: Jameel Miller MD;  Location: [facility] OR        Physical Exam:  Phone visit    Pathology:  Lab Results   Component Value Date    CASEREPORT  09/19/2024     Surgical Pathology Report                         Case: [id number]                                  Authorizing Provider:  Jameel Miller MD         Collected:           09/19/2024 10:33 AM          Ordering Location:     [facility] OR                 Received:            09/19/2024 12:35 PM          Pathologist:           Esperanza Camacho MD                                                    Specimens:   A) - Uterus, Cervix, Bilateral Fallopian Tubes & Ovaries                                            B) - Small Intestine, Small Bowel Nodule                                                   FINALDX  09/19/2024     A. 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 Small bowel, nodule, excision:  - Involved by endometrial adenocarcinoma      SYNOPTIC  09/19/2024     ENDOMETRIUM  ENDOMETRIUM - All Specimens  8th Edition - Protocol posted: 12/13/2023    SPECIMEN     Procedure:    Total hysterectomy and bilateral salpingo-oophorectomy      Hysterectomy Type:    Laparoscopic      Specimen Integrity:    Nodular and hemorrhagic lesion on posterior serosa  (corresponds to full-thickness mass myometrial invasion)     TUMOR     Tumor Site:    Endometrium      Tumor Size:    Greatest Dimension (Centimeters): 5.5 cm       Additional Dimension (Centimeters):    4.6 cm     Histologic Type:    Endometrioid carcinoma, NOS      Histologic Grade:    FIGO grade 2      Two-Tier Grading System:    Low grade (encompassing FIGO 1 and 2)      Myometrial Invasion:    Present        Depth of Myometrial Invasion:    21 mm       Myometrial Thickness:    21 mm       Percentage of Myometrial Invasion:    100 %     Adenomyosis:    Not identified      Uterine Serosa Involvement:    Present      Lower Uterine Segment Involvement:    Present, superficial (non-myoinvasive)      Cervical Stromal Involvement:    Not identified      Other Tissue / Organ Involvement:    Not identified      Peritoneal / Ascitic Fluid:    Not submitted / unknown      Lymphatic and / or Vascular Invasion:    Present        :    Focal (less than 5 vessel involvement): anterior endometrium     REGIONAL LYMPH NODES     Regional Lymph Node Status:    Not applicable (no regional lymph nodes submitted or found)     pTNM CLASSIFICATION (AJCC 8th Edition)     Reporting of pT, pN, and (when applicable) pM categories is based on information available to the pathologist at the time the report is issued. As per the AJCC (Chapter 1, 8th Ed.) it is the managing physician s responsibility to establish the final pathologic stage based upon all pertinent information, including but potentially not limited to this pathology report.     pT Category:    pT3a      pN Category:    pN not assigned (no nodes submitted or found)      pM Category:    pM1        Comment(s):    Best block for ancillary studies: A5   Gynecologic Biomarker Reporting Template  GYNECOLOGIC BIOMARKER REPORTING TEMPLATE - A  Protocol posted: 3/22/2023    TEST(S) PERFORMED     Testing Performed on Block Number(s):    A5      Specimen Type:    Resection      Appropriate  Controls Verified:    Yes      Mismatch Repair (MMR) Protein Status:           Nuclear MLH1 Expression:    Loss      Mismatch Repair (MMR) Protein Status:           Nuclear PMS2 Expression:    Loss      Mismatch Repair (MMR) Protein Status:           Nuclear MSH2 Expression:    Intact      Mismatch Repair (MMR) Protein Status:           Nuclear MSH6 Expression:    Intact      Mismatch Repair (MMR) Protein Status:    Background non-neoplastic tissue / internal control shows intact nuclear expression      Immunohistochemistry (IHC) Interpretation for Mismatch Repair (MMR) Proteins:    Loss of nuclear expression of MLH1 and PMS2: testing for methylation of the MLH1 promoter is indicated (the presence of MLH1 methylation suggests that the tumor is sporadic and germline evaluation is probably not indicated; absence of MLH1 promoter methylation suggests the possibility of Connell syndrome, and sequencing and / or large deletion / duplication testing of germline MLH1 is indicated)      p53 Status:    Normal expression (wild type)     ADDITIONAL TESTS PERFORMED   MLH1 Promoter Methylation Analysis:    POSITIVE         ECOG Performance status: 3 (Capable of only limited selfcare; confined to bed or chair more than 50% of waking hours)     Assessment:  Hortensia is a 84 year old with 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     Plan:  -Deconditioning: patient was already having difficulties with self care prior to surgery but underwent surgery due to significant side effects. Self discharged from TCU so now without services at home. Will place home care and social work consult to evaluate need for services. This has significant impact on her cancer care as it limits our ability to provide standard of care for cancer.     -Chronic pain: difficult to assess by phone but it seems her pain may  be related to her underlying chronic pain and underdosing of this medication. However, given recent surgery if she is having worsening pain or pain not relieved by her usual dosing, she should have CT A/P completed to evaluate for other etiology    - Endometrial cancer, stage IVB: we reviewed her pathology which included invasion of small bowel mesentery due to full thickness uterine involvement. This unfortunately places this as an advanced stage cancer. Standard of care would be carboplatin/paclitaxel/checkpoint inhibitor (dMMR); however, given her current performance status, we both agree that chemotherapy would pose significant threat to her life at this juncture. During our conversation she is understandably more concerned about her other health issues and functional status which limited our ability to discuss a more long term plan. But we did discuss the rationale for usual treatment with systemic therapy and the likelihood of recurrence in the future. Her tumor was fully resected at the time of surgery, but microscopic disease still likely remains making recurrence probable. I favor observation at this time and consideration of treatment at recurrence if her functional status has improved.  If her functional status worsens, then it would be reasonable to consider hospice referral if she were amenable.     - Return in 3 mos for visit with me to discuss how things are going and make a long term follow-up plan.     Jameel Miller MD   Gynecologic Oncology     I spent a total of 25 min on the phone with Hortensia on the day of service.

## 2024-10-17 ENCOUNTER — DOCUMENTATION ONLY (OUTPATIENT)
Dept: ONCOLOGY | Facility: CLINIC | Age: 84
End: 2024-10-17
Payer: COMMERCIAL

## 2024-10-17 NOTE — NURSING NOTE
Received incoming message from [email or web address]:    From: Valeria Marin   Sent: 10/16/2024   9:14 AM CDT   To: Jameel Miller MD   Subject: HOME HEALTH REFERRAL DENIAL- [facility]*     We received a home health referral for this patient  however, we are unable to accept it due to capacity. This referral will need to be sent to another  agency. If you have any further questions, you can contact me at [phone]. Thank you.       Valeria Armenta Lvn   Clinical    [employer].         New referral placed and sent to     [facility]  [facility], [place] [address]    Physical [phone]    Per patient coverage plan.    Dara Bonner, RN, BSN  RN Care Coordinator - Oncology/Hematology  [facility]

## 2024-11-12 NOTE — ANESTHESIA PROCEDURE NOTES
Arterial Line Procedure Note    Pre-Procedure   Staff -        Anesthesiologist:  Jaquan Camejo MD       Performed By: anesthesiologist       Location: OR       Pre-Anesthestic Checklist: patient identified  Timeout:       Correct Patient: Yes        Correct Site: Yes   Line Placement:   This line was placed Post Induction  Procedure   Procedure: arterial line       Laterality: left       Insertion Site: radial.  Sterile Prep        Standard elements of sterile barrier followed       Skin prep: Chloraprep  Insertion/Injection        Technique: Seldinger Technique        Catheter Type/Size: 20 G, 1.75 in/4.5 cm quick cath (integral wire)  Narrative         Secured by: other       Tegaderm dressing used.       Complications: None apparent,        Arterial waveform: Yes

## (undated) DEVICE — ESU ENDO SCISSORS 5MM CVD 5DCS

## (undated) DEVICE — SOL NACL 0.9% IRRIG 1000ML BOTTLE 2F7124

## (undated) DEVICE — TUBING SUCTION 10'X3/16" N510

## (undated) DEVICE — ANTIFOG SOLUTION W/FOAM PAD 31142527

## (undated) DEVICE — PREP DYNA-HEX 4% CHG SCRUB 4OZ BOTTLE MDS098710

## (undated) DEVICE — DEVICE SUTURE PASSER 14GA WECK EFX EFXSP2

## (undated) DEVICE — DRSG PRIMAPORE 02X3" 7133

## (undated) DEVICE — SU MONOCRYL 4-0 PS-2 27" UND Y426H

## (undated) DEVICE — SUCTION IRR STRYKERFLOW II W/TIP 250-070-520

## (undated) DEVICE — SYR 10ML LL W/O NDL 302995

## (undated) DEVICE — ENDO TROCAR FIRST ENTRY KII FIOS Z-THRD 12X100MM CTF73

## (undated) DEVICE — KOH COLPOTOMIZER OCCLUDER  CPO-6

## (undated) DEVICE — TUBING SMOKE EVAC PNEUMOCLEAR HIGH FLOW 0620050250

## (undated) DEVICE — KIT PATIENT POSITIONING PIGAZZI LATEX FREE 40580

## (undated) DEVICE — POUCH TISSUE RETRIEVAL 15MM 6.00" INTRO TRS190SB2

## (undated) DEVICE — COVER CAMERA IN-LIGHT DISP LT-C02

## (undated) DEVICE — TIP CAUTERY L HOOK 36CM E377336C

## (undated) DEVICE — SPECIMEN TRAP MUCOUS 40ML LUKI C30200A

## (undated) DEVICE — SUCTION MANIFOLD NEPTUNE 2 SYS 4 PORT 0702-020-000

## (undated) DEVICE — ESU GROUND PAD ADULT W/CORD E7507

## (undated) DEVICE — ADH LIQUID MASTISOL TOPICAL VIAL 2-3ML 0523-48

## (undated) DEVICE — SUCTION TIP YANKAUER W/O VENT K86

## (undated) DEVICE — GLOVE BIOGEL PI MICRO INDICATOR UNDERGLOVE SZ 7.0 48970

## (undated) DEVICE — SU DERMABOND ADVANCED .7ML DNX12

## (undated) DEVICE — SU VICRYL 2-0 CT-2 27" J333H

## (undated) DEVICE — JELLY LUBRICATING SURGILUBE 2OZ TUBE

## (undated) DEVICE — PROTECTOR ARM ONE-STEP TRENDELENBURG 40418 (COI)

## (undated) DEVICE — SPONGE RAY-TEC 4X8" 7318

## (undated) DEVICE — LINEN TOWEL PACK X30 5481

## (undated) DEVICE — APPLICATORS COTTON TIP 6"X2 STERILE LF C15053-006

## (undated) DEVICE — TUBING IRRIG CYSTO/BLADDER SET 81" LF 2C4040

## (undated) DEVICE — NDL SPINAL 22GA 3.5" QUINCKE 405181

## (undated) DEVICE — WIPES FOLEY CARE SURESTEP PROVON DFC100

## (undated) DEVICE — ENDO TROCAR FIRST ENTRY KII FIOS Z-THRD 05X100MM CTF03

## (undated) DEVICE — SU WND CLOSURE VLOC 180 ABS 4-0 6" CV-23 VLOCL0803

## (undated) DEVICE — ESU PENCIL W/ROCKER SWITCH BLADE HOLSTER E2350HDB

## (undated) DEVICE — SURGICEL HEMOSTAT 4X8" 1952

## (undated) DEVICE — SOL NACL 0.9% INJ 1000ML BAG 2B1324X

## (undated) DEVICE — SU VICRYL 3-0 CT-1 36" J344H

## (undated) DEVICE — DRAPE SHEET MED 44X70" 9355

## (undated) DEVICE — SU VICRYL+ 0 27 UR6 VLT VCP603H

## (undated) DEVICE — PREP CHLORAPREP 26ML TINTED HI-LITE ORANGE 930815

## (undated) DEVICE — GLOVE BIOGEL PI MICRO SZ 6.5 48565

## (undated) DEVICE — LINEN TOWEL PACK X6 WHITE 5487

## (undated) DEVICE — RETR ELEV / UTERINE MANIPULATOR V-CARE MED CUP 60-6085-201A

## (undated) DEVICE — SU WND CLOSURE VLOC 180 ABS 0 9" GS-21 VLOCL0346

## (undated) DEVICE — DRAPE MAYO STAND 23X54 8337

## (undated) DEVICE — Device

## (undated) DEVICE — ENDO TROCAR SLEEVE KII Z-THREADED 05X100MM CTS02

## (undated) DEVICE — DRAPE LEGGINGS CLEAR 8430

## (undated) DEVICE — NDL INSUFFLATION 13GA 120MM C2201

## (undated) DEVICE — CATH TRAY FOLEY SURESTEP 16FR W/TMP PRB STLK LATEX A319416AM

## (undated) DEVICE — SYR 50ML LL W/O NDL 309653

## (undated) DEVICE — ESU LIGASURE LAPAROSCOPIC BLUNT TIP SEALER 5MMX37CM LF1837

## (undated) DEVICE — SOL WATER IRRIG 1000ML BOTTLE 2F7114

## (undated) RX ORDER — HEPARIN SODIUM 5000 [USP'U]/.5ML
INJECTION, SOLUTION INTRAVENOUS; SUBCUTANEOUS
Status: DISPENSED
Start: 2024-09-19

## (undated) RX ORDER — FENTANYL CITRATE 50 UG/ML
INJECTION, SOLUTION INTRAMUSCULAR; INTRAVENOUS
Status: DISPENSED
Start: 2024-09-19

## (undated) RX ORDER — PHENAZOPYRIDINE HYDROCHLORIDE 200 MG/1
TABLET, FILM COATED ORAL
Status: DISPENSED
Start: 2024-09-19